# Patient Record
Sex: MALE | Race: WHITE | NOT HISPANIC OR LATINO | Employment: FULL TIME | URBAN - METROPOLITAN AREA
[De-identification: names, ages, dates, MRNs, and addresses within clinical notes are randomized per-mention and may not be internally consistent; named-entity substitution may affect disease eponyms.]

---

## 2021-08-05 ENCOUNTER — HOSPITAL ENCOUNTER (INPATIENT)
Facility: MEDICAL CENTER | Age: 71
LOS: 7 days | DRG: 189 | End: 2021-08-12
Attending: EMERGENCY MEDICINE | Admitting: HOSPITALIST
Payer: MEDICARE

## 2021-08-05 ENCOUNTER — APPOINTMENT (OUTPATIENT)
Dept: RADIOLOGY | Facility: MEDICAL CENTER | Age: 71
DRG: 189 | End: 2021-08-05
Attending: EMERGENCY MEDICINE
Payer: MEDICARE

## 2021-08-05 DIAGNOSIS — J18.9 PNEUMONIA OF BOTH LUNGS DUE TO INFECTIOUS ORGANISM, UNSPECIFIED PART OF LUNG: ICD-10-CM

## 2021-08-05 DIAGNOSIS — J96.01 ACUTE RESPIRATORY FAILURE WITH HYPOXIA (HCC): ICD-10-CM

## 2021-08-05 DIAGNOSIS — J96.01 ACUTE HYPOXEMIC RESPIRATORY FAILURE (HCC): Primary | ICD-10-CM

## 2021-08-05 DIAGNOSIS — J18.9 PNEUMONIA OF RIGHT LOWER LOBE DUE TO INFECTIOUS ORGANISM: ICD-10-CM

## 2021-08-05 DIAGNOSIS — J81.0 ACUTE PULMONARY EDEMA (HCC): ICD-10-CM

## 2021-08-05 PROBLEM — F17.200 NICOTINE DEPENDENCE: Status: ACTIVE | Noted: 2021-08-05

## 2021-08-05 PROBLEM — R44.3 HALLUCINATIONS: Status: ACTIVE | Noted: 2021-08-05

## 2021-08-05 PROBLEM — F10.939 ALCOHOL WITHDRAWAL (HCC): Status: ACTIVE | Noted: 2021-08-05

## 2021-08-05 PROBLEM — R74.8 ALKALINE PHOSPHATASE ELEVATION: Status: ACTIVE | Noted: 2021-08-05

## 2021-08-05 PROBLEM — R79.89 ELEVATED TROPONIN: Status: ACTIVE | Noted: 2021-08-05

## 2021-08-05 PROBLEM — N17.9 ACUTE RENAL FAILURE (ARF) (HCC): Status: ACTIVE | Noted: 2021-08-05

## 2021-08-05 LAB
ALBUMIN SERPL BCP-MCNC: 3.7 G/DL (ref 3.2–4.9)
ALBUMIN/GLOB SERPL: 1 G/DL
ALP SERPL-CCNC: 155 U/L (ref 30–99)
ALT SERPL-CCNC: 19 U/L (ref 2–50)
AMPHET UR QL SCN: NEGATIVE
ANION GAP SERPL CALC-SCNC: 15 MMOL/L (ref 7–16)
APPEARANCE UR: CLEAR
AST SERPL-CCNC: 22 U/L (ref 12–45)
BACTERIA #/AREA URNS HPF: NEGATIVE /HPF
BARBITURATES UR QL SCN: NEGATIVE
BASOPHILS # BLD AUTO: 0.5 % (ref 0–1.8)
BASOPHILS # BLD: 0.05 K/UL (ref 0–0.12)
BENZODIAZ UR QL SCN: NEGATIVE
BILIRUB SERPL-MCNC: 0.5 MG/DL (ref 0.1–1.5)
BILIRUB UR QL STRIP.AUTO: ABNORMAL
BUN SERPL-MCNC: 41 MG/DL (ref 8–22)
BZE UR QL SCN: NEGATIVE
CALCIUM SERPL-MCNC: 10.2 MG/DL (ref 8.5–10.5)
CANNABINOIDS UR QL SCN: NEGATIVE
CHLORIDE SERPL-SCNC: 97 MMOL/L (ref 96–112)
CO2 SERPL-SCNC: 25 MMOL/L (ref 20–33)
COLOR UR: ABNORMAL
CREAT SERPL-MCNC: 2.46 MG/DL (ref 0.5–1.4)
EKG IMPRESSION: NORMAL
EOSINOPHIL # BLD AUTO: 0.02 K/UL (ref 0–0.51)
EOSINOPHIL NFR BLD: 0.2 % (ref 0–6.9)
EPI CELLS #/AREA URNS HPF: NEGATIVE /HPF
ERYTHROCYTE [DISTWIDTH] IN BLOOD BY AUTOMATED COUNT: 61.2 FL (ref 35.9–50)
ETHANOL BLD-MCNC: <10.1 MG/DL (ref 0–10)
FLUAV RNA SPEC QL NAA+PROBE: NEGATIVE
FLUBV RNA SPEC QL NAA+PROBE: NEGATIVE
GLOBULIN SER CALC-MCNC: 3.8 G/DL (ref 1.9–3.5)
GLUCOSE SERPL-MCNC: 150 MG/DL (ref 65–99)
GLUCOSE UR STRIP.AUTO-MCNC: NEGATIVE MG/DL
HCT VFR BLD AUTO: 49.1 % (ref 42–52)
HGB BLD-MCNC: 15.9 G/DL (ref 14–18)
HYALINE CASTS #/AREA URNS LPF: ABNORMAL /LPF
IMM GRANULOCYTES # BLD AUTO: 0.05 K/UL (ref 0–0.11)
IMM GRANULOCYTES NFR BLD AUTO: 0.5 % (ref 0–0.9)
KETONES UR STRIP.AUTO-MCNC: NEGATIVE MG/DL
LACTATE BLD-SCNC: 1.5 MMOL/L (ref 0.5–2)
LACTATE BLD-SCNC: 1.9 MMOL/L (ref 0.5–2)
LACTATE BLD-SCNC: 2.6 MMOL/L (ref 0.5–2)
LEUKOCYTE ESTERASE UR QL STRIP.AUTO: NEGATIVE
LYMPHOCYTES # BLD AUTO: 0.67 K/UL (ref 1–4.8)
LYMPHOCYTES NFR BLD: 7.3 % (ref 22–41)
MAGNESIUM SERPL-MCNC: 2.1 MG/DL (ref 1.5–2.5)
MCH RBC QN AUTO: 32.1 PG (ref 27–33)
MCHC RBC AUTO-ENTMCNC: 32.4 G/DL (ref 33.7–35.3)
MCV RBC AUTO: 99 FL (ref 81.4–97.8)
METHADONE UR QL SCN: NEGATIVE
MICRO URNS: ABNORMAL
MONOCYTES # BLD AUTO: 0.52 K/UL (ref 0–0.85)
MONOCYTES NFR BLD AUTO: 5.6 % (ref 0–13.4)
NEUTROPHILS # BLD AUTO: 7.9 K/UL (ref 1.82–7.42)
NEUTROPHILS NFR BLD: 85.9 % (ref 44–72)
NITRITE UR QL STRIP.AUTO: NEGATIVE
NRBC # BLD AUTO: 0 K/UL
NRBC BLD-RTO: 0 /100 WBC
NT-PROBNP SERPL IA-MCNC: ABNORMAL PG/ML (ref 0–125)
OPIATES UR QL SCN: NEGATIVE
OXYCODONE UR QL SCN: NEGATIVE
PCP UR QL SCN: NEGATIVE
PH UR STRIP.AUTO: 5.5 [PH] (ref 5–8)
PLATELET # BLD AUTO: 251 K/UL (ref 164–446)
PMV BLD AUTO: 10.8 FL (ref 9–12.9)
POTASSIUM SERPL-SCNC: 5.3 MMOL/L (ref 3.6–5.5)
PROPOXYPH UR QL SCN: NEGATIVE
PROT SERPL-MCNC: 7.5 G/DL (ref 6–8.2)
PROT UR QL STRIP: 30 MG/DL
RBC # BLD AUTO: 4.96 M/UL (ref 4.7–6.1)
RBC # URNS HPF: ABNORMAL /HPF
RBC UR QL AUTO: NEGATIVE
RSV RNA SPEC QL NAA+PROBE: NEGATIVE
SARS-COV-2 RNA RESP QL NAA+PROBE: NOTDETECTED
SODIUM SERPL-SCNC: 137 MMOL/L (ref 135–145)
SP GR UR STRIP.AUTO: 1.01
SPECIMEN SOURCE: NORMAL
TROPONIN T SERPL-MCNC: 53 NG/L (ref 6–19)
TROPONIN T SERPL-MCNC: 57 NG/L (ref 6–19)
TROPONIN T SERPL-MCNC: 69 NG/L (ref 6–19)
UROBILINOGEN UR STRIP.AUTO-MCNC: 0.2 MG/DL
WBC # BLD AUTO: 9.2 K/UL (ref 4.8–10.8)
WBC #/AREA URNS HPF: ABNORMAL /HPF

## 2021-08-05 PROCEDURE — 99223 1ST HOSP IP/OBS HIGH 75: CPT | Mod: AI | Performed by: HOSPITALIST

## 2021-08-05 PROCEDURE — 700111 HCHG RX REV CODE 636 W/ 250 OVERRIDE (IP): Performed by: HOSPITALIST

## 2021-08-05 PROCEDURE — 93005 ELECTROCARDIOGRAM TRACING: CPT

## 2021-08-05 PROCEDURE — 87086 URINE CULTURE/COLONY COUNT: CPT

## 2021-08-05 PROCEDURE — 0241U HCHG SARS-COV-2 COVID-19 NFCT DS RESP RNA 4 TRGT MIC: CPT

## 2021-08-05 PROCEDURE — 99285 EMERGENCY DEPT VISIT HI MDM: CPT

## 2021-08-05 PROCEDURE — 85025 COMPLETE CBC W/AUTO DIFF WBC: CPT

## 2021-08-05 PROCEDURE — 93005 ELECTROCARDIOGRAM TRACING: CPT | Performed by: EMERGENCY MEDICINE

## 2021-08-05 PROCEDURE — A9270 NON-COVERED ITEM OR SERVICE: HCPCS | Performed by: HOSPITALIST

## 2021-08-05 PROCEDURE — 80307 DRUG TEST PRSMV CHEM ANLYZR: CPT

## 2021-08-05 PROCEDURE — 71045 X-RAY EXAM CHEST 1 VIEW: CPT

## 2021-08-05 PROCEDURE — 83880 ASSAY OF NATRIURETIC PEPTIDE: CPT

## 2021-08-05 PROCEDURE — HZ2ZZZZ DETOXIFICATION SERVICES FOR SUBSTANCE ABUSE TREATMENT: ICD-10-PCS | Performed by: HOSPITALIST

## 2021-08-05 PROCEDURE — 700102 HCHG RX REV CODE 250 W/ 637 OVERRIDE(OP): Performed by: HOSPITALIST

## 2021-08-05 PROCEDURE — 82077 ASSAY SPEC XCP UR&BREATH IA: CPT

## 2021-08-05 PROCEDURE — 700111 HCHG RX REV CODE 636 W/ 250 OVERRIDE (IP): Performed by: EMERGENCY MEDICINE

## 2021-08-05 PROCEDURE — 80053 COMPREHEN METABOLIC PANEL: CPT

## 2021-08-05 PROCEDURE — 83605 ASSAY OF LACTIC ACID: CPT | Mod: 91

## 2021-08-05 PROCEDURE — 770020 HCHG ROOM/CARE - TELE (206)

## 2021-08-05 PROCEDURE — 84484 ASSAY OF TROPONIN QUANT: CPT

## 2021-08-05 PROCEDURE — 94760 N-INVAS EAR/PLS OXIMETRY 1: CPT

## 2021-08-05 PROCEDURE — 96365 THER/PROPH/DIAG IV INF INIT: CPT

## 2021-08-05 PROCEDURE — 36415 COLL VENOUS BLD VENIPUNCTURE: CPT

## 2021-08-05 PROCEDURE — 83735 ASSAY OF MAGNESIUM: CPT

## 2021-08-05 PROCEDURE — 700105 HCHG RX REV CODE 258: Performed by: EMERGENCY MEDICINE

## 2021-08-05 PROCEDURE — C9803 HOPD COVID-19 SPEC COLLECT: HCPCS | Performed by: EMERGENCY MEDICINE

## 2021-08-05 PROCEDURE — 87040 BLOOD CULTURE FOR BACTERIA: CPT

## 2021-08-05 PROCEDURE — 700101 HCHG RX REV CODE 250: Performed by: EMERGENCY MEDICINE

## 2021-08-05 PROCEDURE — 96375 TX/PRO/DX INJ NEW DRUG ADDON: CPT

## 2021-08-05 PROCEDURE — 81001 URINALYSIS AUTO W/SCOPE: CPT

## 2021-08-05 RX ORDER — NICOTINE POLACRILEX 4 MG/1
20 GUM, CHEWING ORAL EVERY EVENING
Status: ON HOLD | COMMUNITY
End: 2021-08-12

## 2021-08-05 RX ORDER — PREGABALIN 50 MG/1
50 CAPSULE ORAL 2 TIMES DAILY
Status: ON HOLD | COMMUNITY
End: 2021-08-12

## 2021-08-05 RX ORDER — LORAZEPAM 0.5 MG/1
0.5 TABLET ORAL EVERY 4 HOURS PRN
Status: DISCONTINUED | OUTPATIENT
Start: 2021-08-05 | End: 2021-08-09 | Stop reason: ALTCHOICE

## 2021-08-05 RX ORDER — GAUZE BANDAGE 2" X 2"
100 BANDAGE TOPICAL DAILY
Status: COMPLETED | OUTPATIENT
Start: 2021-08-05 | End: 2021-08-08

## 2021-08-05 RX ORDER — TRAZODONE HYDROCHLORIDE 50 MG/1
50 TABLET ORAL
Status: ON HOLD | COMMUNITY
End: 2021-08-12

## 2021-08-05 RX ORDER — FUROSEMIDE 20 MG/1
20 TABLET ORAL 2 TIMES DAILY
Status: ON HOLD | COMMUNITY
End: 2021-08-12

## 2021-08-05 RX ORDER — LABETALOL HYDROCHLORIDE 5 MG/ML
10 INJECTION, SOLUTION INTRAVENOUS EVERY 4 HOURS PRN
Status: DISCONTINUED | OUTPATIENT
Start: 2021-08-05 | End: 2021-08-12 | Stop reason: HOSPADM

## 2021-08-05 RX ORDER — ONDANSETRON 2 MG/ML
4 INJECTION INTRAMUSCULAR; INTRAVENOUS EVERY 4 HOURS PRN
Status: DISCONTINUED | OUTPATIENT
Start: 2021-08-05 | End: 2021-08-09 | Stop reason: ALTCHOICE

## 2021-08-05 RX ORDER — AMOXICILLIN 250 MG
2 CAPSULE ORAL 2 TIMES DAILY
Status: DISCONTINUED | OUTPATIENT
Start: 2021-08-06 | End: 2021-08-12 | Stop reason: HOSPADM

## 2021-08-05 RX ORDER — ALLOPURINOL 100 MG/1
50-100 TABLET ORAL 2 TIMES DAILY
Status: ON HOLD | COMMUNITY
End: 2021-08-12 | Stop reason: SDUPTHER

## 2021-08-05 RX ORDER — B-COMPLEX WITH VITAMIN C
1 TABLET ORAL 2 TIMES DAILY
Status: ON HOLD | COMMUNITY
End: 2021-08-12

## 2021-08-05 RX ORDER — ONDANSETRON 4 MG/1
4 TABLET, ORALLY DISINTEGRATING ORAL EVERY 4 HOURS PRN
Status: DISCONTINUED | OUTPATIENT
Start: 2021-08-05 | End: 2021-08-09 | Stop reason: ALTCHOICE

## 2021-08-05 RX ORDER — OMEPRAZOLE 20 MG/1
20 CAPSULE, DELAYED RELEASE ORAL EVERY EVENING
Status: DISCONTINUED | OUTPATIENT
Start: 2021-08-05 | End: 2021-08-12 | Stop reason: HOSPADM

## 2021-08-05 RX ORDER — POLYETHYLENE GLYCOL 3350 17 G/17G
1 POWDER, FOR SOLUTION ORAL
Status: DISCONTINUED | OUTPATIENT
Start: 2021-08-05 | End: 2021-08-12 | Stop reason: HOSPADM

## 2021-08-05 RX ORDER — BISACODYL 10 MG
10 SUPPOSITORY, RECTAL RECTAL
Status: DISCONTINUED | OUTPATIENT
Start: 2021-08-05 | End: 2021-08-12 | Stop reason: HOSPADM

## 2021-08-05 RX ORDER — LORAZEPAM 2 MG/ML
1.5 INJECTION INTRAMUSCULAR
Status: DISCONTINUED | OUTPATIENT
Start: 2021-08-05 | End: 2021-08-09 | Stop reason: ALTCHOICE

## 2021-08-05 RX ORDER — CYCLOBENZAPRINE HCL 10 MG
10 TABLET ORAL
Status: ON HOLD | COMMUNITY
End: 2021-08-12

## 2021-08-05 RX ORDER — LORAZEPAM 2 MG/ML
1 INJECTION INTRAMUSCULAR
Status: DISCONTINUED | OUTPATIENT
Start: 2021-08-05 | End: 2021-08-09 | Stop reason: ALTCHOICE

## 2021-08-05 RX ORDER — LORAZEPAM 2 MG/ML
2 INJECTION INTRAMUSCULAR
Status: DISCONTINUED | OUTPATIENT
Start: 2021-08-05 | End: 2021-08-09 | Stop reason: ALTCHOICE

## 2021-08-05 RX ORDER — FOLIC ACID 1 MG/1
1 TABLET ORAL DAILY
Status: COMPLETED | OUTPATIENT
Start: 2021-08-05 | End: 2021-08-08

## 2021-08-05 RX ORDER — FUROSEMIDE 20 MG/1
20 TABLET ORAL 2 TIMES DAILY
Status: DISCONTINUED | OUTPATIENT
Start: 2021-08-05 | End: 2021-08-09

## 2021-08-05 RX ORDER — SPIRONOLACTONE 25 MG/1
12.5 TABLET ORAL EVERY MORNING
Status: DISCONTINUED | OUTPATIENT
Start: 2021-08-05 | End: 2021-08-12 | Stop reason: HOSPADM

## 2021-08-05 RX ORDER — HEPARIN SODIUM 5000 [USP'U]/ML
5000 INJECTION, SOLUTION INTRAVENOUS; SUBCUTANEOUS EVERY 8 HOURS
Status: DISCONTINUED | OUTPATIENT
Start: 2021-08-05 | End: 2021-08-12 | Stop reason: HOSPADM

## 2021-08-05 RX ORDER — LIDOCAINE 40 MG/G
1 CREAM TOPICAL
Status: ON HOLD | COMMUNITY
End: 2021-08-12

## 2021-08-05 RX ORDER — HYDRALAZINE HYDROCHLORIDE 25 MG/1
25 TABLET, FILM COATED ORAL 2 TIMES DAILY
Status: DISCONTINUED | OUTPATIENT
Start: 2021-08-05 | End: 2021-08-12 | Stop reason: HOSPADM

## 2021-08-05 RX ORDER — SPIRONOLACTONE 25 MG/1
12.5 TABLET ORAL EVERY MORNING
Status: ON HOLD | COMMUNITY
End: 2021-08-12

## 2021-08-05 RX ORDER — AMOXICILLIN 250 MG
1 CAPSULE ORAL 2 TIMES DAILY
Status: ON HOLD | COMMUNITY
End: 2021-08-12

## 2021-08-05 RX ORDER — METOPROLOL SUCCINATE 100 MG/1
100 TABLET, EXTENDED RELEASE ORAL EVERY EVENING
Status: ON HOLD | COMMUNITY
End: 2021-08-12

## 2021-08-05 RX ORDER — AMIODARONE HYDROCHLORIDE 200 MG/1
200 TABLET ORAL EVERY EVENING
Status: DISCONTINUED | OUTPATIENT
Start: 2021-08-05 | End: 2021-08-12 | Stop reason: HOSPADM

## 2021-08-05 RX ORDER — HYDRALAZINE HYDROCHLORIDE 25 MG/1
25-50 TABLET, FILM COATED ORAL 2 TIMES DAILY
Status: ON HOLD | COMMUNITY
End: 2021-08-12

## 2021-08-05 RX ORDER — CLONIDINE HYDROCHLORIDE 0.1 MG/1
0.1 TABLET ORAL 2 TIMES DAILY
Status: DISCONTINUED | OUTPATIENT
Start: 2021-08-05 | End: 2021-08-12 | Stop reason: HOSPADM

## 2021-08-05 RX ORDER — CLONIDINE HYDROCHLORIDE 0.1 MG/1
0.1 TABLET ORAL 2 TIMES DAILY
Status: ON HOLD | COMMUNITY
End: 2021-08-12

## 2021-08-05 RX ORDER — LORAZEPAM 2 MG/ML
0.5 INJECTION INTRAMUSCULAR EVERY 4 HOURS PRN
Status: DISCONTINUED | OUTPATIENT
Start: 2021-08-05 | End: 2021-08-09 | Stop reason: ALTCHOICE

## 2021-08-05 RX ORDER — LORAZEPAM 2 MG/1
2 TABLET ORAL
Status: DISCONTINUED | OUTPATIENT
Start: 2021-08-05 | End: 2021-08-09 | Stop reason: ALTCHOICE

## 2021-08-05 RX ORDER — AZITHROMYCIN 500 MG/5ML
500 INJECTION, POWDER, LYOPHILIZED, FOR SOLUTION INTRAVENOUS ONCE
Status: COMPLETED | OUTPATIENT
Start: 2021-08-05 | End: 2021-08-05

## 2021-08-05 RX ORDER — LORAZEPAM 1 MG/1
1 TABLET ORAL EVERY 4 HOURS PRN
Status: DISCONTINUED | OUTPATIENT
Start: 2021-08-05 | End: 2021-08-09 | Stop reason: ALTCHOICE

## 2021-08-05 RX ORDER — NICOTINE 21 MG/24HR
14 PATCH, TRANSDERMAL 24 HOURS TRANSDERMAL
Status: DISCONTINUED | OUTPATIENT
Start: 2021-08-05 | End: 2021-08-12 | Stop reason: HOSPADM

## 2021-08-05 RX ORDER — AZITHROMYCIN 500 MG/5ML
500 INJECTION, POWDER, LYOPHILIZED, FOR SOLUTION INTRAVENOUS EVERY 24 HOURS
Status: DISCONTINUED | OUTPATIENT
Start: 2021-08-06 | End: 2021-08-06

## 2021-08-05 RX ORDER — METOPROLOL SUCCINATE 50 MG/1
100 TABLET, EXTENDED RELEASE ORAL EVERY EVENING
Status: DISCONTINUED | OUTPATIENT
Start: 2021-08-05 | End: 2021-08-12 | Stop reason: HOSPADM

## 2021-08-05 RX ORDER — LORAZEPAM 2 MG/1
4 TABLET ORAL
Status: DISCONTINUED | OUTPATIENT
Start: 2021-08-05 | End: 2021-08-09 | Stop reason: ALTCHOICE

## 2021-08-05 RX ORDER — AMIODARONE HYDROCHLORIDE 200 MG/1
200 TABLET ORAL EVERY EVENING
COMMUNITY

## 2021-08-05 RX ORDER — ACETAMINOPHEN 325 MG/1
650 TABLET ORAL EVERY 6 HOURS PRN
Status: DISCONTINUED | OUTPATIENT
Start: 2021-08-05 | End: 2021-08-12 | Stop reason: HOSPADM

## 2021-08-05 RX ORDER — SIMVASTATIN 20 MG
20 TABLET ORAL EVERY EVENING
Status: ON HOLD | COMMUNITY
End: 2021-08-12

## 2021-08-05 RX ADMIN — HEPARIN SODIUM 5000 UNITS: 5000 INJECTION, SOLUTION INTRAVENOUS; SUBCUTANEOUS at 23:02

## 2021-08-05 RX ADMIN — LORAZEPAM 1 MG: 2 INJECTION INTRAMUSCULAR; INTRAVENOUS at 17:45

## 2021-08-05 RX ADMIN — CLONIDINE HYDROCHLORIDE 0.1 MG: 0.1 TABLET ORAL at 17:35

## 2021-08-05 RX ADMIN — HYDRALAZINE HYDROCHLORIDE 25 MG: 25 TABLET, FILM COATED ORAL at 17:34

## 2021-08-05 RX ADMIN — FOLIC ACID 1 MG: 1 TABLET ORAL at 16:55

## 2021-08-05 RX ADMIN — LORAZEPAM 0.5 MG: 2 INJECTION INTRAMUSCULAR; INTRAVENOUS at 23:02

## 2021-08-05 RX ADMIN — OMEPRAZOLE 20 MG: 20 CAPSULE, DELAYED RELEASE ORAL at 17:35

## 2021-08-05 RX ADMIN — SPIRONOLACTONE 12.5 MG: 25 TABLET ORAL at 16:54

## 2021-08-05 RX ADMIN — METOPROLOL SUCCINATE 100 MG: 50 TABLET, EXTENDED RELEASE ORAL at 17:34

## 2021-08-05 RX ADMIN — LORAZEPAM 1 MG: 2 INJECTION INTRAMUSCULAR; INTRAVENOUS at 20:00

## 2021-08-05 RX ADMIN — FUROSEMIDE 20 MG: 20 TABLET ORAL at 17:34

## 2021-08-05 RX ADMIN — Medication 100 MG: at 16:55

## 2021-08-05 RX ADMIN — AMIODARONE HYDROCHLORIDE 200 MG: 200 TABLET ORAL at 17:35

## 2021-08-05 RX ADMIN — THERA TABS 1 TABLET: TAB at 16:54

## 2021-08-05 RX ADMIN — NICOTINE 14 MG: 14 PATCH TRANSDERMAL at 16:55

## 2021-08-05 RX ADMIN — AZITHROMYCIN MONOHYDRATE 500 MG: 500 INJECTION, POWDER, LYOPHILIZED, FOR SOLUTION INTRAVENOUS at 14:06

## 2021-08-05 RX ADMIN — CEFTRIAXONE SODIUM 2 G: 10 INJECTION, POWDER, FOR SOLUTION INTRAVENOUS at 12:11

## 2021-08-05 RX ADMIN — ASPIRIN 81 MG: 81 TABLET, COATED ORAL at 16:54

## 2021-08-05 ASSESSMENT — ENCOUNTER SYMPTOMS
DIAPHORESIS: 0
HEADACHES: 0
ABDOMINAL PAIN: 0
DOUBLE VISION: 0
WEIGHT LOSS: 0
SORE THROAT: 0
FALLS: 0
HEARTBURN: 0
NECK PAIN: 0
SENSORY CHANGE: 1
DIARRHEA: 0
FOCAL WEAKNESS: 0
CHILLS: 0
SHORTNESS OF BREATH: 1
NAUSEA: 0
GASTROINTESTINAL NEGATIVE: 1
BACK PAIN: 1
BLURRED VISION: 0
WEAKNESS: 1
PALPITATIONS: 0
BRUISES/BLEEDS EASILY: 0
SINUS PAIN: 0
COUGH: 1
HALLUCINATIONS: 1
CARDIOVASCULAR NEGATIVE: 1
CONSTITUTIONAL NEGATIVE: 1
DIZZINESS: 0
DEPRESSION: 0
FEVER: 0
EYES NEGATIVE: 1
BLOOD IN STOOL: 0
CONSTIPATION: 0
VOMITING: 0
MYALGIAS: 0

## 2021-08-05 ASSESSMENT — LIFESTYLE VARIABLES
AUDITORY DISTURBANCES: NOT PRESENT
HEADACHE, FULLNESS IN HEAD: NOT PRESENT
DOES PATIENT WANT TO STOP DRINKING: YES
AGITATION: *
PAROXYSMAL SWEATS: NO SWEAT VISIBLE
AUDITORY DISTURBANCES: NOT PRESENT
ORIENTATION AND CLOUDING OF SENSORIUM: DISORIENTED FOR PLACE AND / OR PERSON
HEADACHE, FULLNESS IN HEAD: NOT PRESENT
HAVE PEOPLE ANNOYED YOU BY CRITICIZING YOUR DRINKING: YES
SUBSTANCE_ABUSE: 1
DOES PATIENT WANT TO STOP DRINKING: NO
NAUSEA AND VOMITING: NO NAUSEA AND NO VOMITING
AGITATION: SOMEWHAT MORE THAN NORMAL ACTIVITY
NAUSEA AND VOMITING: NO NAUSEA AND NO VOMITING
PAROXYSMAL SWEATS: NO SWEAT VISIBLE
TOTAL SCORE: 3
ANXIETY: *
TOTAL SCORE: 3
AVERAGE NUMBER OF DAYS PER WEEK YOU HAVE A DRINK CONTAINING ALCOHOL: 6
CONSUMPTION TOTAL: POSITIVE
TOTAL SCORE: 3
VISUAL DISTURBANCES: MODERATE SENSITIVITY
TREMOR: NO TREMOR
ANXIETY: *
NAUSEA AND VOMITING: NO NAUSEA AND NO VOMITING
AGITATION: *
PAROXYSMAL SWEATS: NO SWEAT VISIBLE
DO YOU DRINK ALCOHOL: YES
HEADACHE, FULLNESS IN HEAD: NOT PRESENT
TREMOR: NO TREMOR
ORIENTATION AND CLOUDING OF SENSORIUM: DISORIENTED FOR PLACE AND / OR PERSON
ORIENTATION AND CLOUDING OF SENSORIUM: ORIENTED AND CAN DO SERIAL ADDITIONS
ORIENTATION AND CLOUDING OF SENSORIUM: ORIENTED AND CAN DO SERIAL ADDITIONS
TOTAL SCORE: 11
VISUAL DISTURBANCES: MODERATE SENSITIVITY
TREMOR: NO TREMOR
DO YOU DRINK ALCOHOL: NO
NAUSEA AND VOMITING: NO NAUSEA AND NO VOMITING
AUDITORY DISTURBANCES: NOT PRESENT
ANXIETY: *
EVER HAD A DRINK FIRST THING IN THE MORNING TO STEADY YOUR NERVES TO GET RID OF A HANGOVER: NO
VISUAL DISTURBANCES: MODERATELY SEVERE HALLUCINATIONS
AGITATION: SOMEWHAT MORE THAN NORMAL ACTIVITY
EVER FELT BAD OR GUILTY ABOUT YOUR DRINKING: YES
VISUAL DISTURBANCES: MODERATE SENSITIVITY
TOTAL SCORE: 13
HEADACHE, FULLNESS IN HEAD: NOT PRESENT
AUDITORY DISTURBANCES: NOT PRESENT
TREMOR: NO TREMOR
HAVE YOU EVER FELT YOU SHOULD CUT DOWN ON YOUR DRINKING: YES
ANXIETY: MILDLY ANXIOUS
ON A TYPICAL DAY WHEN YOU DRINK ALCOHOL HOW MANY DRINKS DO YOU HAVE: 5
PAROXYSMAL SWEATS: NO SWEAT VISIBLE
TOTAL SCORE: 10
TOTAL SCORE: 5
HOW MANY TIMES IN THE PAST YEAR HAVE YOU HAD 5 OR MORE DRINKS IN A DAY: 350

## 2021-08-05 ASSESSMENT — COGNITIVE AND FUNCTIONAL STATUS - GENERAL
HELP NEEDED FOR BATHING: A LOT
MOVING FROM LYING ON BACK TO SITTING ON SIDE OF FLAT BED: A LOT
STANDING UP FROM CHAIR USING ARMS: A LOT
DRESSING REGULAR UPPER BODY CLOTHING: A LOT
DAILY ACTIVITIY SCORE: 13
SUGGESTED CMS G CODE MODIFIER MOBILITY: CL
MOBILITY SCORE: 14
PERSONAL GROOMING: A LOT
DRESSING REGULAR LOWER BODY CLOTHING: A LOT
SUGGESTED CMS G CODE MODIFIER DAILY ACTIVITY: CL
MOVING TO AND FROM BED TO CHAIR: A LOT
WALKING IN HOSPITAL ROOM: A LOT
TOILETING: A LOT
EATING MEALS: A LITTLE
CLIMB 3 TO 5 STEPS WITH RAILING: A LOT

## 2021-08-05 ASSESSMENT — FIBROSIS 4 INDEX: FIB4 SCORE: 1.47

## 2021-08-05 ASSESSMENT — PATIENT HEALTH QUESTIONNAIRE - PHQ9
SUM OF ALL RESPONSES TO PHQ9 QUESTIONS 1 AND 2: 0
2. FEELING DOWN, DEPRESSED, IRRITABLE, OR HOPELESS: NOT AT ALL
1. LITTLE INTEREST OR PLEASURE IN DOING THINGS: NOT AT ALL

## 2021-08-05 ASSESSMENT — PAIN DESCRIPTION - PAIN TYPE
TYPE: CHRONIC PAIN
TYPE: CHRONIC PAIN
TYPE: NEUROPATHIC PAIN;CHRONIC PAIN

## 2021-08-05 NOTE — ED PROVIDER NOTES
ED Provider Note    Scribed for Gisbon Haywood M.D. by Ashley Walton. 8/5/2021  11:14 AM    Primary care provider: None noted  Means of arrival: EMS  History obtained from: Patient  History limited by: Altered mental status    CHIEF COMPLAINT  Chief Complaint   Patient presents with    Fatigue    Shortness of Breath     HPI  Ra Ware is a 71 y.o. male who presents to the Emergency Department with worsening shortness of breath and fatigue. His symptoms initially onset last Friday. He states that he is not usually reliant on oxygen in the home, and he is vaccinated for COVID-19. He takes a daily regimen of Dieretics that he admits he is still compliant with. He has no history of CHF. He denies any associated confusion, fevers, cough, or loss of smell/taste. He admits to alcohol ingestion yesterday night.     HPI limited secondary to Altered mental status.     REVIEW OF SYSTEMS  Pertinent positives include shortness of breath and fatigue. Pertinent negatives include no confusion, fevers, cough, or loss of smell/taste.  All other systems reviewed and negative.  ROS limited secondary to Altered mental status.     PAST MEDICAL HISTORY   has a past medical history of CAD (coronary artery disease) and HTN (hypertension).    SURGICAL HISTORY   has a past surgical history that includes pacemaker insertion.    SOCIAL HISTORY  Social History     Tobacco Use    Smoking status: Never Smoker    Smokeless tobacco: Never Used   Substance Use Topics    Alcohol use: Yes    Drug use: None noted      FAMILY HISTORY  History reviewed. No pertinent family history.    CURRENT MEDICATIONS  No current outpatient medications    ALLERGIES  Allergies   Allergen Reactions    Morphine Rash and Itching             PHYSICAL EXAM  VITAL SIGNS: /92   Pulse 91   Temp 36.7 °C (98 °F) (Temporal)   Resp (!) 26   Ht 1.829 m (6')   Wt 95.3 kg (210 lb)   SpO2 99%   BMI 28.48 kg/m²     Constitutional: Well developed, Well nourished,  moderate distress, Non-toxic appearance.   HENT: Normocephalic, Atraumatic, Bilateral external ears normal, Oropharynx moist, No oral exudates.   Eyes: PERRLA, EOMI, Conjunctiva normal, No discharge.   Neck: No tenderness, Supple, No stridor.   Lymphatic: No lymphadenopathy noted.   Cardiovascular: Normal heart rate, Normal rhythm.   Thorax & Lungs: Decreased breath sounds throughout, Crackles in bilateral bases, No respiratory distress, No wheezing,    Abdomen: Soft, No tenderness, No masses, No pulsatile masses.   Skin: Warm, Dry, No erythema, No rash.   Extremities:, Diffuse lower extremity edema, No cyanosis.   Musculoskeletal: No tenderness to palpation or major deformities noted.  Intact distal pulses  Neurologic: Mumbled speech, Seems confused and sleepy, Cranial nerves 2-11 intact, muscle strength 5/5 in bilateral upper and lower extremities   Psychiatric: Affect normal, Judgment normal, Mood normal.     EKG  Results for orders placed or performed during the hospital encounter of 21   EKG   Result Value Ref Range    Report       [facility] Emergency Dept.    Test Date:  2021  Pt Name:    DORA PLUNKETT                  Department: ER  MRN:        [id number]                      Room:        19  Gender:     Male                         Technician: [id number]  :        1950                   Requested By:ER TRIAGE PROTOCOL  Order #:    [id number]                    Reading MD: JUANITA YOUNG MD    Measurements  Intervals                                Axis  Rate:       62                           P:          109  NE:         212                          QRS:        245  QRSD:       144                          T:          60  QT:         472  QTc:        480    Interpretive Statements  A-V DUAL-PACED RHYTHM WITH SOME INHIBITION  NO FURTHER ANALYSIS ATTEMPTED DUE TO PACED RHYTHM  No previous ECG available for comparison  Electronically Signed On 2021 13:28:24 PDT by  JUANITA YOUNG MD       LABS  Results for orders placed or performed during the hospital encounter of 08/05/21   Lactic acid (lactate)   Result Value Ref Range    Lactic Acid 2.6 (H) 0.5 - 2.0 mmol/L   Lactic acid (lactate): Repeat if initial lactic acid result is greater than 2   Result Value Ref Range    Lactic Acid 1.9 0.5 - 2.0 mmol/L   CBC WITH DIFFERENTIAL   Result Value Ref Range    WBC 9.2 4.8 - 10.8 K/uL    RBC 4.96 4.70 - 6.10 M/uL    Hemoglobin 15.9 14.0 - 18.0 g/dL    Hematocrit 49.1 42.0 - 52.0 %    MCV 99.0 (H) 81.4 - 97.8 fL    MCH 32.1 27.0 - 33.0 pg    MCHC 32.4 (L) 33.7 - 35.3 g/dL    RDW 61.2 (H) 35.9 - 50.0 fL    Platelet Count 251 164 - 446 K/uL    MPV 10.8 9.0 - 12.9 fL    Neutrophils-Polys 85.90 (H) 44.00 - 72.00 %    Lymphocytes 7.30 (L) 22.00 - 41.00 %    Monocytes 5.60 0.00 - 13.40 %    Eosinophils 0.20 0.00 - 6.90 %    Basophils 0.50 0.00 - 1.80 %    Immature Granulocytes 0.50 0.00 - 0.90 %    Nucleated RBC 0.00 /100 WBC    Neutrophils (Absolute) 7.90 (H) 1.82 - 7.42 K/uL    Lymphs (Absolute) 0.67 (L) 1.00 - 4.80 K/uL    Monos (Absolute) 0.52 0.00 - 0.85 K/uL    Eos (Absolute) 0.02 0.00 - 0.51 K/uL    Baso (Absolute) 0.05 0.00 - 0.12 K/uL    Immature Granulocytes (abs) 0.05 0.00 - 0.11 K/uL    NRBC (Absolute) 0.00 K/uL   COMP METABOLIC PANEL   Result Value Ref Range    Sodium 137 135 - 145 mmol/L    Potassium 5.3 3.6 - 5.5 mmol/L    Chloride 97 96 - 112 mmol/L    Co2 25 20 - 33 mmol/L    Anion Gap 15.0 7.0 - 16.0    Glucose 150 (H) 65 - 99 mg/dL    Bun 41 (H) 8 - 22 mg/dL    Creatinine 2.46 (H) 0.50 - 1.40 mg/dL    Calcium 10.2 8.5 - 10.5 mg/dL    AST(SGOT) 22 12 - 45 U/L    ALT(SGPT) 19 2 - 50 U/L    Alkaline Phosphatase 155 (H) 30 - 99 U/L    Total Bilirubin 0.5 0.1 - 1.5 mg/dL    Albumin 3.7 3.2 - 4.9 g/dL    Total Protein 7.5 6.0 - 8.2 g/dL    Globulin 3.8 (H) 1.9 - 3.5 g/dL    A-G Ratio 1.0 g/dL   URINALYSIS    Specimen: Urine, Clean Catch   Result Value Ref Range    Color DK  Yellow     Character Clear     Specific Gravity 1.015 <1.035    Ph 5.5 5.0 - 8.0    Glucose Negative Negative mg/dL    Ketones Negative Negative mg/dL    Protein 30 (A) Negative mg/dL    Bilirubin Small (A) Negative    Urobilinogen, Urine 0.2 Negative    Nitrite Negative Negative    Leukocyte Esterase Negative Negative    Occult Blood Negative Negative    Micro Urine Req Microscopic    TROPONIN   Result Value Ref Range    Troponin T 57 (H) 6 - 19 ng/L   COV-2, FLU A/B, AND RSV BY PCR (2-4 HOURS CEPHEID): Collect NP swab in VTM    Specimen: Respirate   Result Value Ref Range    Influenza virus A RNA Negative Negative    Influenza virus B, PCR Negative Negative    RSV, PCR Negative Negative    SARS-CoV-2 by PCR NotDetected     SARS-CoV-2 Source NP Swab    URINE DRUG SCREEN   Result Value Ref Range    Amphetamines Urine Negative Negative    Barbiturates Negative Negative    Benzodiazepines Negative Negative    Cocaine Metabolite Negative Negative    Methadone Negative Negative    Opiates Negative Negative    Oxycodone Negative Negative    Phencyclidine -Pcp Negative Negative    Propoxyphene Negative Negative    Cannabinoid Metab Negative Negative   DIAGNOSTIC ALCOHOL   Result Value Ref Range    Diagnostic Alcohol <10.1 0.0 - 10.0 mg/dL   ESTIMATED GFR   Result Value Ref Range    GFR If  31 (A) >60 mL/min/1.73 m 2    GFR If Non African American 26 (A) >60 mL/min/1.73 m 2   proBrain Natriuretic Peptide, NT   Result Value Ref Range    NT-proBNP 71757 (H) 0 - 125 pg/mL   URINE MICROSCOPIC (W/UA)   Result Value Ref Range    WBC 0-2 (A) /hpf    RBC 0-2 (A) /hpf    Bacteria Negative None /hpf    Epithelial Cells Negative /hpf    Hyaline Cast 0-2 /lpf   EKG   Result Value Ref Range    Report       [facility] Emergency Dept.    Test Date:  2021-08-05  Pt Name:    DORA PLUNKETT                  Department: ER  MRN:        [id number]                      Room:       BL 19  Gender:     Male                          Technician: [id number]  :        1950                   Requested By:ER TRIAGE PROTOCOL  Order #:    [id number]                    Reading MD: JUANITA YOUNG MD    Measurements  Intervals                                Axis  Rate:       62                           P:          109  DC:         212                          QRS:        245  QRSD:       144                          T:          60  QT:         472  QTc:        480    Interpretive Statements  A-V DUAL-PACED RHYTHM WITH SOME INHIBITION  NO FURTHER ANALYSIS ATTEMPTED DUE TO PACED RHYTHM  No previous ECG available for comparison  Electronically Signed On 2021 13:28:24 PDT by JUANITA YOUNG MD          RADIOLOGY  DX-CHEST-PORTABLE (1 VIEW)   Final Result      1.  There is no enlarged cardiac silhouette with probable changes of vascular congestion/edema.   2.  Right lower lobe opacity could be edema, atelectasis or pneumonitis.   3.  There is a small right pleural effusion.      EC-ECHOCARDIOGRAM COMPLETE W/O CONT    (Results Pending)     The radiologist's interpretation of all radiological studies have been reviewed by me.      COURSE & MEDICAL DECISION MAKING  Pertinent Labs & Imaging studies reviewed. (See chart for details)    11:14 AM - Patient seen and examined at bedside. Ordered DX-chest, lactic acid, diagnostic alcohol, urine drug screen, troponin, COV-2 FLU, CBC with diff, CMP, urinalysis, urine culture, blood culture, and EKG to evaluate his symptoms. The differential diagnoses include but are not limited to: sepsis, CHF, or PE.    1:37 PM I discussed the patient's case and the above findings with Dr. Prieto (hospitalist) who agrees to hospitalize the patient and take over plan of care moving forward.     CRITICAL CARE  The very real possibilty of a deterioration of this patient's condition required the highest level of my preparedness for sudden, emergent intervention.  I provided critical care services, which  included medication orders, frequent reevaluations of the patient's condition and response to treatment, ordering and reviewing test results, and discussing the case with various consultants.  The critical care time associated with the care of the patient was 37 minutes. Review chart for interventions. This time is exclusive of any other billable procedures.     Decision Making:  Patient is coming in with generalized fatigue, shortness of breath. The patient is a very poor historian, visiting from out of town, the patient appears to be fluid overloaded however the patient does have a pneumonia on chest x-ray. Give the patient IV antibiotics, discussed the case with the hospitalist for hospitalization.    DISPOSITION:  Patient will be hospitalized by Dr. Prieto in critical condition.    FINAL IMPRESSION  1. Acute respiratory failure with hypoxia (HCC)    2. Pneumonia of right lower lobe due to infectious organism    3. Acute pulmonary edema (HCC)       Altered Mental Status     Ashley TERRAZAS (Rakesh), am scribing for, and in the presence of, Gibson Haywood M.D..    Electronically signed by: Ashley Walton (Rakesh), 8/5/2021    IGibson M.D. personally performed the services described in this documentation, as scribed by Ashley Walton in my presence, and it is both accurate and complete.    The note accurately reflects work and decisions made by me.  Gibson Haywood M.D.  8/5/2021  5:14 PM

## 2021-08-05 NOTE — PROGRESS NOTES
4 Eyes Skin Assessment Completed by LINDSEY Jones and LINDSEY Duarte.    Head WDL  Ears Redness and Blanching  Nose Redness and Blanching  Mouth WDL  Neck WDL  Breast/Chest WDL  Shoulder Blades WDL  Spine Redness and Blanching  (R) Arm/Elbow/Hand Redness, Blanching, Bruising, Abrasion and Scab; abrasion/scab to top pf right wrist  (L) Arm/Elbow/Hand Redness, Blanching and Bruising  Abdomen WDL  Groin WDL  Scrotum/Coccyx/Buttocks Redness and Blanching  (R) Leg Bruising and Edema; abrasion to right knee  (L) Leg Bruising, Swelling and Edema  (R) Heel/Foot/Toe Redness, Blanching and Discoloration; dry flaky heels  (L) Heel/Foot/Toe Redness, Blanching and Discoloration; dry flaky heels          Devices In Places Tele Box, Pulse Ox, Condom Cath and Nasal Cannula      Interventions In Place Gray Ear Foams, Pillows and Heels Loaded W/Pillows    Possible Skin Injury No    Pictures Uploaded Into Epic N/A  Wound Consult Placed N/A  RN Wound Prevention Protocol Ordered No

## 2021-08-05 NOTE — ASSESSMENT & PLAN NOTE
Suspected right sided; resolved  B/c and sputum cultures: NEG  Completed Augmentin PO  On aspiration protocol  On seizure protocol  Incentive spirometer

## 2021-08-05 NOTE — ED NOTES
"Patient is currently hallucinating seeing 'fish bowls\" in the room. Pt's wife is very concerned by his behavior.   She reports he started taking Lyrica for nerve pain 3 days ago and has been very sleepy since but has continued to drink. Pt has not drank for the last day and a half and continues to be tired and weak and confused. She is leaving at this time and left her phone number. Updated on POC thus far.     "

## 2021-08-05 NOTE — ASSESSMENT & PLAN NOTE
Mild; Follow-up CMP  GGT: 76; slightly elevated; due to liver/biliary component (most likely his alcohol use)

## 2021-08-05 NOTE — ASSESSMENT & PLAN NOTE
Suspect due to CHF and pneumonia  ELEAZAR? Patient remarked being diagnosed but not on CPAP; mallempai: 4; ABG: respi acidosis   *Does not tolerate CPAP  Completed Augmentin  ECHO: Grade 3 diastolic dysfunction, EF 70%  Continue lasix; discontinue spironolactone (K: 4.6)  Continue oxygen (not reliant on O2 at home); try to wean off  Continue Duonebs, d/c prednisone 40 mg  Query underlying copd (mild expiratory wheezes) with extensive smoking h/o  Mobilize patient as tolerated; PT/OT consulted: They now recommend home health. I talked to his wife via phone 2 times today and she is very concerned because he has had home health in the past and it has not worked (he goes back to drinking and not being able to do his ADLs); continues to feel that the best thing for him is SNF/IRF in order for him to further strengthen and be able to take care of himself.  We will follow up recommendations.Ultimately, it is up to the patient.  Right now discharge is pending placement arrangements.

## 2021-08-05 NOTE — ED TRIAGE NOTES
Pt arrives to ED from hotel c/o SOB, weakness, lethargy x 3 days. Per ems wife reports is getting progressively worse. Is normally a&ox4 but is a&ox3 per wife. Is globally weak but no other focal deficits. Does drink almost daily. Feels more sob than normal was initally 88% improved on 4L NC. Is visiting from out of town.

## 2021-08-05 NOTE — ED NOTES
Med rec completed per Pt's spouse at bedside; medications reviewed per pill planner provided by Pt's spouse; Pt's [facility] in [place] ([phone]) contacted for med list to verify Pt's PRN medications (Flexeril and trazodone) and strengths/directions for other medications.  Allergies reviewed with Pt's spouse.  Pt's spouse unsure of strengths of Pt's vitamins/supplements.  Per [facility], Pt is prescribed hydralazine 25 mg with directions to take 1 tablet 3 times per day. Per pill planner at bedside, Pt takes 1 tablet (25 mg) in the morning and 2 tablets (50 mg) in the evening.  No oral antibiotics in last 30 days.

## 2021-08-05 NOTE — ASSESSMENT & PLAN NOTE
Decreased GFR; unknown baseline    FeNa: <1%; most likely prerenal  Condom kidd on  BUN: 36  CR: 1.7

## 2021-08-05 NOTE — ED NOTES
Pt given antibiotics per orders. Blood cultures completed prior. Pt and wife at bedside updated on POC. Call light within reach.

## 2021-08-05 NOTE — ASSESSMENT & PLAN NOTE
H/o etoh abuse; last use Wednesday    Discontinued CIWA protocol; increased Seroquel to 100 mg at night for agitation   *No tremors on physical exam today  Replete electrolytes as needed; f/u CMP

## 2021-08-05 NOTE — PROGRESS NOTES
Assumed care of PT A&O 4. Pt lethargic and slurring words. Pt resting in bed with mild effort of breathing. On 6 liters NC. Tele monitor in place, cardiac rhythm being monitored. Call light within reach, bed in lowest position, upper bed rails up. Seizure precautions, aspiration precautions and fall risk precautions in place. Pt was updated on plan of care for the day. Will continue to monitor.

## 2021-08-05 NOTE — H&P
"Hospital Medicine History & Physical Note    Date of Service  8/5/2021    Primary Care Physician  Pcp Unknown    Consultants    Code Status  Full Code    Chief Complaint  Chief Complaint   Patient presents with   • Fatigue   • Shortness of Breath       History of Presenting Illness  Patient is a 71 year old male with a history of htn, chronic back pain, etoh abuse, nicotine dependence and gerd.  He and his wife are visiting [place] from [facility] for [place] nights.  Patient is confused and a poor historian.  I obtained history from the Erp and patient's wife Chantale(over the phone).     Patient was started on lyrica three days ago by his pain specialist and since that time has been \"hallucinating at night\", telling his wife that she is not his mother and seeing things.  He has also been drinking heavily (on top of being a heavy drinker at baseline it has been \"non stop\" for several days).  Although, his bal is currently low.  Patient tells me he has been sob for three days, he was found to be hypoxic at 83% on RA in the ER. Denies chest pain, no n/v, no dizziness, no headache.  He reports a non productive cough.    He ha been vaccinated, his covid test is pending.  He is paced and per my interpretation his x ray reveals a small right sided pleural effusion, a right sided opacity and increased vascular congestion.      Review of Systems  Review of Systems   Constitutional: Negative.  Negative for chills, diaphoresis, fever, malaise/fatigue and weight loss.   HENT: Negative.  Negative for congestion, hearing loss, sinus pain and sore throat.    Eyes: Negative.  Negative for blurred vision and double vision.   Respiratory: Positive for cough and shortness of breath.    Cardiovascular: Negative.  Negative for chest pain, palpitations and leg swelling.   Gastrointestinal: Negative.  Negative for abdominal pain, blood in stool, constipation, diarrhea, heartburn, melena, nausea and vomiting.   Genitourinary: Negative.  " "Negative for dysuria and urgency.   Musculoskeletal: Positive for back pain (chronc). Negative for falls, joint pain, myalgias and neck pain.   Skin: Negative.  Negative for itching and rash.   Neurological: Positive for sensory change (chronic neuropathy) and weakness (per wife subacute on chronic). Negative for dizziness, focal weakness and headaches.   Endo/Heme/Allergies: Negative.  Does not bruise/bleed easily.   Psychiatric/Behavioral: Positive for hallucinations and substance abuse. Negative for depression and suicidal ideas.   All other systems reviewed and are negative.      Past Medical History   has a past medical history of CAD (coronary artery disease) and HTN (hypertension).chronic back pain, gerd, neuropathy    Surgical History   has a past surgical history that includes pacemaker insertion. defibrillator, maze procedure  ablation    Family History  family history is not on file.   Family history reviewed with patient. CAD in father, 'liver disease\" in his mother  Social History   reports that he has never smoked. He has never used smokeless tobacco. He reports current alcohol use.  He reports 40 pack year smoking history, currently 1/2 ppd, per wife 6-10 oz of etoh daily (with a lot more earlier this week) no h/o etoh w/d or seizures, no h/o illicit drug use, two children    Allergies  Allergies   Allergen Reactions   • Morphine Rash and Itching             Medications  Prior to Admission Medications   Prescriptions Last Dose Informant Patient Reported? Taking?   Acetaminophen 500 MG Cap 8/4/2021 at afternoon Significant Other Yes Yes   Sig: Take 1,000 mg by mouth every 6 hours as needed (Mild Pain). 2 capsules = 1,000 mg.   B Complex Vitamins (VITAMIN B COMPLEX) Tab 8/4/2021 at 2330 Significant Other Yes Yes   Sig: Take 1 tablet by mouth 2 times a day.   CALCIUM PO 8/4/2021 at 1100 Significant Other Yes Yes   Sig: Take 1 capsule by mouth every morning.   Cholecalciferol (VITAMIN D3 PO) 8/4/2021 at " "2330 Significant Other Yes Yes   Sig: Take 2 Capsules by mouth every evening.   Cyanocobalamin (VITAMIN B-12 PO) 8/4/2021 at 1100 Significant Other Yes Yes   Sig: Take 2 Capsules by mouth every morning.   NON SPECIFIED 8/4/2021 at 2330 Significant Other Yes Yes   Sig: Take 2 Tablets by mouth every evening. \"Testosterone booster\" over-the-counter supplement.   Omega-3 Fatty Acids (FISH OIL PO) 8/4/2021 at 2330 Significant Other Yes Yes   Sig: Take 1 capsule by mouth every evening.   TURMERIC PO 8/4/2021 at 2330 Significant Other Yes Yes   Sig: Take 1 capsule by mouth every evening.   allopurinol (ZYLOPRIM) 100 MG Tab 8/4/2021 at [facility] Patient's Home Pharmacy Yes Yes   Sig: Take  mg by mouth 2 times a day. Take 1 tablet (100 mg) every morning and 1/2 tablet (50 mg) every evening.   amiodarone (CORDARONE) 200 MG Tab 8/4/2021 at [facility] Patient's Home Pharmacy Yes Yes   Sig: Take 200 mg by mouth every evening.   cloNIDine (CATAPRES) 0.1 MG Tab 8/4/2021 at [facility] Patient's Home Pharmacy Yes Yes   Sig: Take 0.1 mg by mouth 2 times a day.   cyclobenzaprine (FLEXERIL) 10 mg Tab 8/4/2021 at [facility] Patient's Home Pharmacy Yes Yes   Sig: Take 10 mg by mouth at bedtime as needed for Muscle Spasms.   furosemide (LASIX) 20 MG Tab 8/4/2021 at [facility] Patient's Home Pharmacy Yes Yes   Sig: Take 20 mg by mouth 2 times a day.   hydrALAZINE (APRESOLINE) 25 MG Tab 8/4/2021 at 2330 Significant Other Yes Yes   Sig: Take 25-50 mg by mouth 2 times a day. Take 1 tablet (25 mg) in the morning and 2 tablets (50 mg) in the evening.   lidocaine (LMX) 4 % Cream 8/4/2021 at  Significant Other Yes Yes   Sig: Apply 1 Application topically 1 time a day as needed (Pain). Applied to back.   metoprolol SR (TOPROL XL) 100 MG TABLET SR 24 HR 8/4/2021 at [facility] Patient's Home Pharmacy Yes Yes   Sig: Take 100 mg by mouth every evening.   multivitamin (THERAGRAN) Tab 8/4/2021 at 1100 Significant Other Yes Yes   Sig: Take 1 tablet by mouth every morning. "   omeprazole (PRILOSEC) 20 MG Tablet Delayed Response delayed-release tablet 8/4/2021 at 2330 Significant Other Yes Yes   Sig: Take 20 mg by mouth every evening.   pregabalin (LYRICA) 50 MG capsule 8/4/2021 at 2330 Patient's Home Pharmacy Yes Yes   Sig: Take 50 mg by mouth 2 times a day.   senna-docusate (PERICOLACE OR SENOKOT S) 8.6-50 MG Tab 8/4/2021 at 2330 Significant Other Yes Yes   Sig: Take 1 tablet by mouth 2 times a day.   simvastatin (ZOCOR) 20 MG Tab 8/4/2021 at 2330 Patient's Home Pharmacy Yes Yes   Sig: Take 20 mg by mouth every evening.   spironolactone (ALDACTONE) 25 MG Tab 8/4/2021 at 1100 Patient's Home Pharmacy Yes Yes   Sig: Take 12.5 mg by mouth every morning. 1/2 tablet = 12.5 mg.   traZODone (DESYREL) 50 MG Tab 8/4/2021 at 2330 Patient's Home Pharmacy Yes Yes   Sig: Take 50 mg by mouth at bedtime as needed for Sleep.      Facility-Administered Medications: None       Physical Exam  Temp:  [36.7 °C (98 °F)] 36.7 °C (98 °F)  Pulse:  [60-91] 77  Resp:  [22-27] 27  BP: (127-176)/() 176/100  SpO2:  [84 %-99 %] 94 %    Physical Exam  Vitals and nursing note reviewed. Exam conducted with a chaperone present.   Constitutional:       General: He is not in acute distress.     Appearance: Normal appearance. He is not diaphoretic.   HENT:      Head: Normocephalic.      Nose: Nose normal.      Mouth/Throat:      Mouth: Mucous membranes are moist.   Eyes:      Pupils: Pupils are equal, round, and reactive to light.   Cardiovascular:      Rate and Rhythm: Normal rate.      Pulses: Normal pulses.      Heart sounds: Normal heart sounds. No murmur heard.        Comments: Paced  Pacer pocket  Pulmonary:      Effort: Pulmonary effort is normal. No respiratory distress.      Breath sounds: Rales (right) present.   Abdominal:      General: Abdomen is flat. Bowel sounds are normal. There is no distension.      Palpations: Abdomen is soft. There is no mass.      Tenderness: There is no abdominal tenderness.  There is no guarding or rebound.      Hernia: No hernia is present.      Comments: Well healed abdominal scars   Musculoskeletal:         General: No swelling, tenderness, deformity or signs of injury. Normal range of motion.      Right lower leg: Edema present.      Left lower leg: Edema present.   Skin:     General: Skin is warm and dry.      Capillary Refill: Capillary refill takes 2 to 3 seconds.      Coloration: Skin is not jaundiced or pale.      Findings: No bruising, erythema, lesion or rash.      Comments: Multiple excoriations arms, no secondary infection    Chronic statis changes of feet with discoloration   Neurological:      General: No focal deficit present.      Mental Status: He is alert.      Cranial Nerves: No cranial nerve deficit.      Comments: Oriented to person, month, and knows we are in the hospital but thinks it is in [place]   Psychiatric:         Mood and Affect: Mood normal.         Behavior: Behavior normal.         Laboratory:  Recent Labs     08/05/21  1109   WBC 9.2   RBC 4.96   HEMOGLOBIN 15.9   HEMATOCRIT 49.1   MCV 99.0*   MCH 32.1   MCHC 32.4*   RDW 61.2*   PLATELETCT 251   MPV 10.8     Recent Labs     08/05/21  1109   SODIUM 137   POTASSIUM 5.3   CHLORIDE 97   CO2 25   GLUCOSE 150*   BUN 41*   CREATININE 2.46*   CALCIUM 10.2     Recent Labs     08/05/21  1109   ALTSGPT 19   ASTSGOT 22   ALKPHOSPHAT 155*   TBILIRUBIN 0.5   GLUCOSE 150*         Recent Labs     08/05/21  1232   NTPROBNP 61227*         Recent Labs     08/05/21  1232   TROPONINT 57*       Imaging:  DX-CHEST-PORTABLE (1 VIEW)   Final Result      1.  There is no enlarged cardiac silhouette with probable changes of vascular congestion/edema.   2.  Right lower lobe opacity could be edema, atelectasis or pneumonitis.   3.  There is a small right pleural effusion.      EC-ECHOCARDIOGRAM COMPLETE W/O CONT    (Results Pending)         Assessment/Plan:  I anticipate this patient will require at least two midnights for  appropriate medical management, necessitating inpatient admission.    Alkaline phosphatase elevation  Assessment & Plan  Mild  following    Acute renal failure (ARF) (HCC)  Assessment & Plan  Baseline unknown  Will bladder scan  Following  Volume overloaded currently on exam     Nicotine dependence  Assessment & Plan  Cessation discussed and recommended  replacement    Alcohol withdrawal (HCC)  Assessment & Plan  H/o etoh abuse  Suspected w/d  ciwa  mvi  Following closely     Acute hypoxemic respiratory failure (HCC)  Assessment & Plan  Suspect due to chf and pneumonia  Continue abx  Will check echo  Resume his lasix and aldactone  Continue oxygen  Query underlying copd (no wheezing currently) with extensive smoking h/o  following    Pneumonia  Assessment & Plan  Suspected right sided  Will check procalcitonin  B/c and sputum cultures   Empiric azitromycin  following    Hallucinations  Assessment & Plan  Mild  Likely multifactorial  Started with new medication - lyrica - will stop as it has only been a few days  Suspect etoh w/d contributing  ciwa  No focal deficits  Following  Will also check ammonia levels    Elevated troponin  Assessment & Plan  Suspected acute heart failure  Ekg paced, no acute ischemic changes noted  Denies chest pain  Trop only minimally elevated  Will trend trop  Start aspirin  Tele  Will check echo   pending      VTE prophylaxis: heparin ppx

## 2021-08-06 ENCOUNTER — APPOINTMENT (OUTPATIENT)
Dept: CARDIOLOGY | Facility: MEDICAL CENTER | Age: 71
DRG: 189 | End: 2021-08-06
Attending: HOSPITALIST
Payer: MEDICARE

## 2021-08-06 LAB
ALBUMIN SERPL BCP-MCNC: 2.9 G/DL (ref 3.2–4.9)
ALBUMIN/GLOB SERPL: 0.9 G/DL
ALP SERPL-CCNC: 119 U/L (ref 30–99)
ALT SERPL-CCNC: 12 U/L (ref 2–50)
AMMONIA PLAS-SCNC: 21 UMOL/L (ref 11–45)
ANION GAP SERPL CALC-SCNC: 12 MMOL/L (ref 7–16)
APPEARANCE UR: CLEAR
AST SERPL-CCNC: 18 U/L (ref 12–45)
BASE EXCESS BLDA CALC-SCNC: 2 MMOL/L (ref -4–3)
BILIRUB SERPL-MCNC: 0.3 MG/DL (ref 0.1–1.5)
BILIRUB UR QL STRIP.AUTO: NEGATIVE
BODY TEMPERATURE: ABNORMAL CENTIGRADE
BUN SERPL-MCNC: 41 MG/DL (ref 8–22)
CALCIUM SERPL-MCNC: 9.6 MG/DL (ref 8.5–10.5)
CHLORIDE SERPL-SCNC: 101 MMOL/L (ref 96–112)
CHOLEST SERPL-MCNC: 140 MG/DL (ref 100–199)
CO2 SERPL-SCNC: 25 MMOL/L (ref 20–33)
COLOR UR: NORMAL
CREAT SERPL-MCNC: 1.89 MG/DL (ref 0.5–1.4)
CREAT UR-MCNC: 87.05 MG/DL
ERYTHROCYTE [DISTWIDTH] IN BLOOD BY AUTOMATED COUNT: 61.5 FL (ref 35.9–50)
GLOBULIN SER CALC-MCNC: 3.2 G/DL (ref 1.9–3.5)
GLUCOSE SERPL-MCNC: 93 MG/DL (ref 65–99)
GLUCOSE UR STRIP.AUTO-MCNC: NEGATIVE MG/DL
HCO3 BLDA-SCNC: 29 MMOL/L (ref 17–25)
HCT VFR BLD AUTO: 46 % (ref 42–52)
HDLC SERPL-MCNC: 65 MG/DL
HGB BLD-MCNC: 14.6 G/DL (ref 14–18)
KETONES UR STRIP.AUTO-MCNC: NEGATIVE MG/DL
LDLC SERPL CALC-MCNC: 54 MG/DL
LEUKOCYTE ESTERASE UR QL STRIP.AUTO: NEGATIVE
LV EJECT FRACT  99904: 70
LV EJECT FRACT MOD 2C 99903: 72.41
LV EJECT FRACT MOD 4C 99902: 74.71
LV EJECT FRACT MOD BP 99901: 72.96
MAGNESIUM SERPL-MCNC: 2 MG/DL (ref 1.5–2.5)
MCH RBC QN AUTO: 31.8 PG (ref 27–33)
MCHC RBC AUTO-ENTMCNC: 31.7 G/DL (ref 33.7–35.3)
MCV RBC AUTO: 100.2 FL (ref 81.4–97.8)
MICRO URNS: NORMAL
NITRITE UR QL STRIP.AUTO: NEGATIVE
PCO2 BLDA: 57.5 MMHG (ref 26–37)
PH BLDA: 7.33 [PH] (ref 7.4–7.5)
PH UR STRIP.AUTO: 5.5 [PH] (ref 5–8)
PHOSPHATE SERPL-MCNC: 3.9 MG/DL (ref 2.5–4.5)
PHOSPHATE SERPL-MCNC: 4 MG/DL (ref 2.5–4.5)
PLATELET # BLD AUTO: 204 K/UL (ref 164–446)
PMV BLD AUTO: 11 FL (ref 9–12.9)
PO2 BLDA: 102.8 MMHG (ref 64–87)
POTASSIUM SERPL-SCNC: 4.6 MMOL/L (ref 3.6–5.5)
PROCALCITONIN SERPL-MCNC: 0.3 NG/ML
PROT SERPL-MCNC: 6.1 G/DL (ref 6–8.2)
PROT UR QL STRIP: NEGATIVE MG/DL
RBC # BLD AUTO: 4.59 M/UL (ref 4.7–6.1)
RBC UR QL AUTO: NEGATIVE
SAO2 % BLDA: 96.4 % (ref 93–99)
SODIUM SERPL-SCNC: 138 MMOL/L (ref 135–145)
SODIUM UR-SCNC: 45 MMOL/L
SP GR UR STRIP.AUTO: 1.01
TRIGL SERPL-MCNC: 103 MG/DL (ref 0–149)
UROBILINOGEN UR STRIP.AUTO-MCNC: 0.2 MG/DL
WBC # BLD AUTO: 8.7 K/UL (ref 4.8–10.8)

## 2021-08-06 PROCEDURE — 80053 COMPREHEN METABOLIC PANEL: CPT

## 2021-08-06 PROCEDURE — 36415 COLL VENOUS BLD VENIPUNCTURE: CPT

## 2021-08-06 PROCEDURE — 83735 ASSAY OF MAGNESIUM: CPT

## 2021-08-06 PROCEDURE — 84300 ASSAY OF URINE SODIUM: CPT

## 2021-08-06 PROCEDURE — 80061 LIPID PANEL: CPT

## 2021-08-06 PROCEDURE — 82803 BLOOD GASES ANY COMBINATION: CPT

## 2021-08-06 PROCEDURE — 93306 TTE W/DOPPLER COMPLETE: CPT

## 2021-08-06 PROCEDURE — 84145 PROCALCITONIN (PCT): CPT

## 2021-08-06 PROCEDURE — A9270 NON-COVERED ITEM OR SERVICE: HCPCS | Performed by: HOSPITALIST

## 2021-08-06 PROCEDURE — 770020 HCHG ROOM/CARE - TELE (206)

## 2021-08-06 PROCEDURE — 81003 URINALYSIS AUTO W/O SCOPE: CPT

## 2021-08-06 PROCEDURE — 84100 ASSAY OF PHOSPHORUS: CPT

## 2021-08-06 PROCEDURE — 700102 HCHG RX REV CODE 250 W/ 637 OVERRIDE(OP): Performed by: HOSPITALIST

## 2021-08-06 PROCEDURE — 82570 ASSAY OF URINE CREATININE: CPT

## 2021-08-06 PROCEDURE — 700101 HCHG RX REV CODE 250: Performed by: HOSPITALIST

## 2021-08-06 PROCEDURE — A9270 NON-COVERED ITEM OR SERVICE: HCPCS | Performed by: STUDENT IN AN ORGANIZED HEALTH CARE EDUCATION/TRAINING PROGRAM

## 2021-08-06 PROCEDURE — 700111 HCHG RX REV CODE 636 W/ 250 OVERRIDE (IP): Performed by: HOSPITALIST

## 2021-08-06 PROCEDURE — 93306 TTE W/DOPPLER COMPLETE: CPT | Mod: 26 | Performed by: INTERNAL MEDICINE

## 2021-08-06 PROCEDURE — 85027 COMPLETE CBC AUTOMATED: CPT

## 2021-08-06 PROCEDURE — 700101 HCHG RX REV CODE 250: Performed by: STUDENT IN AN ORGANIZED HEALTH CARE EDUCATION/TRAINING PROGRAM

## 2021-08-06 PROCEDURE — 82140 ASSAY OF AMMONIA: CPT

## 2021-08-06 PROCEDURE — 99233 SBSQ HOSP IP/OBS HIGH 50: CPT | Mod: GC | Performed by: INTERNAL MEDICINE

## 2021-08-06 PROCEDURE — 700102 HCHG RX REV CODE 250 W/ 637 OVERRIDE(OP): Performed by: STUDENT IN AN ORGANIZED HEALTH CARE EDUCATION/TRAINING PROGRAM

## 2021-08-06 PROCEDURE — 700105 HCHG RX REV CODE 258: Performed by: HOSPITALIST

## 2021-08-06 PROCEDURE — 94640 AIRWAY INHALATION TREATMENT: CPT

## 2021-08-06 PROCEDURE — 97165 OT EVAL LOW COMPLEX 30 MIN: CPT

## 2021-08-06 RX ORDER — IPRATROPIUM BROMIDE AND ALBUTEROL SULFATE 2.5; .5 MG/3ML; MG/3ML
3 SOLUTION RESPIRATORY (INHALATION)
Status: DISCONTINUED | OUTPATIENT
Start: 2021-08-06 | End: 2021-08-06

## 2021-08-06 RX ORDER — IPRATROPIUM BROMIDE AND ALBUTEROL SULFATE 2.5; .5 MG/3ML; MG/3ML
3 SOLUTION RESPIRATORY (INHALATION)
Status: DISCONTINUED | OUTPATIENT
Start: 2021-08-06 | End: 2021-08-07

## 2021-08-06 RX ORDER — AMOXICILLIN AND CLAVULANATE POTASSIUM 875; 125 MG/1; MG/1
1 TABLET, FILM COATED ORAL EVERY 12 HOURS
Status: COMPLETED | OUTPATIENT
Start: 2021-08-06 | End: 2021-08-09

## 2021-08-06 RX ADMIN — AMOXICILLIN AND CLAVULANATE POTASSIUM 1 TABLET: 875; 125 TABLET, FILM COATED ORAL at 17:40

## 2021-08-06 RX ADMIN — FOLIC ACID 1 MG: 1 TABLET ORAL at 05:11

## 2021-08-06 RX ADMIN — DOCUSATE SODIUM 50 MG AND SENNOSIDES 8.6 MG 2 TABLET: 8.6; 5 TABLET, FILM COATED ORAL at 05:10

## 2021-08-06 RX ADMIN — AZITHROMYCIN MONOHYDRATE 500 MG: 500 INJECTION, POWDER, LYOPHILIZED, FOR SOLUTION INTRAVENOUS at 05:29

## 2021-08-06 RX ADMIN — THERA TABS 1 TABLET: TAB at 05:14

## 2021-08-06 RX ADMIN — LORAZEPAM 0.5 MG: 0.5 TABLET ORAL at 05:10

## 2021-08-06 RX ADMIN — IPRATROPIUM BROMIDE AND ALBUTEROL SULFATE 3 ML: .5; 2.5 SOLUTION RESPIRATORY (INHALATION) at 19:22

## 2021-08-06 RX ADMIN — Medication 100 MG: at 05:10

## 2021-08-06 RX ADMIN — ASPIRIN 81 MG: 81 TABLET, COATED ORAL at 05:10

## 2021-08-06 RX ADMIN — CHOLECALCIFEROL (VITAMIN D3) 10 MCG (400 UNIT) TABLET 800 UNITS: at 17:41

## 2021-08-06 RX ADMIN — METOPROLOL SUCCINATE 100 MG: 50 TABLET, EXTENDED RELEASE ORAL at 17:40

## 2021-08-06 RX ADMIN — HEPARIN SODIUM 5000 UNITS: 5000 INJECTION, SOLUTION INTRAVENOUS; SUBCUTANEOUS at 05:11

## 2021-08-06 RX ADMIN — DOCUSATE SODIUM 50 MG AND SENNOSIDES 8.6 MG 2 TABLET: 8.6; 5 TABLET, FILM COATED ORAL at 17:41

## 2021-08-06 RX ADMIN — OMEPRAZOLE 20 MG: 20 CAPSULE, DELAYED RELEASE ORAL at 17:40

## 2021-08-06 RX ADMIN — FUROSEMIDE 20 MG: 20 TABLET ORAL at 17:42

## 2021-08-06 RX ADMIN — NICOTINE 14 MG: 14 PATCH TRANSDERMAL at 05:33

## 2021-08-06 RX ADMIN — HEPARIN SODIUM 5000 UNITS: 5000 INJECTION, SOLUTION INTRAVENOUS; SUBCUTANEOUS at 14:53

## 2021-08-06 RX ADMIN — AMIODARONE HYDROCHLORIDE 200 MG: 200 TABLET ORAL at 17:41

## 2021-08-06 RX ADMIN — LORAZEPAM 1 MG: 1 TABLET ORAL at 17:27

## 2021-08-06 RX ADMIN — LORAZEPAM 1 MG: 1 TABLET ORAL at 21:13

## 2021-08-06 RX ADMIN — HEPARIN SODIUM 5000 UNITS: 5000 INJECTION, SOLUTION INTRAVENOUS; SUBCUTANEOUS at 21:13

## 2021-08-06 RX ADMIN — SPIRONOLACTONE 12.5 MG: 25 TABLET ORAL at 05:14

## 2021-08-06 ASSESSMENT — ENCOUNTER SYMPTOMS
SHORTNESS OF BREATH: 1
VOMITING: 0
CHILLS: 0
SPUTUM PRODUCTION: 1
HEADACHES: 0
NAUSEA: 0
ABDOMINAL PAIN: 0
FEVER: 0
HALLUCINATIONS: 1
COUGH: 1
WHEEZING: 1
BACK PAIN: 1

## 2021-08-06 ASSESSMENT — LIFESTYLE VARIABLES
PAROXYSMAL SWEATS: BARELY PERCEPTIBLE SWEATING. PALMS MOIST
HEADACHE, FULLNESS IN HEAD: NOT PRESENT
PAROXYSMAL SWEATS: BARELY PERCEPTIBLE SWEATING. PALMS MOIST
AUDITORY DISTURBANCES: NOT PRESENT
AGITATION: SOMEWHAT MORE THAN NORMAL ACTIVITY
TREMOR: *
NAUSEA AND VOMITING: NO NAUSEA AND NO VOMITING
ANXIETY: *
PAROXYSMAL SWEATS: BARELY PERCEPTIBLE SWEATING. PALMS MOIST
TOTAL SCORE: 5
VISUAL DISTURBANCES: NOT PRESENT
ANXIETY: *
ANXIETY: *
TREMOR: NO TREMOR
TREMOR: *
ORIENTATION AND CLOUDING OF SENSORIUM: DATE DISORIENTATION BY NO MORE THAN TWO CALENDAR DAYS
TOTAL SCORE: 10
AGITATION: SOMEWHAT MORE THAN NORMAL ACTIVITY
ORIENTATION AND CLOUDING OF SENSORIUM: DATE DISORIENTATION BY MORE THAN TWO CALENDAR DAYS
ANXIETY: *
TOTAL SCORE: 10
VISUAL DISTURBANCES: NOT PRESENT
AUDITORY DISTURBANCES: NOT PRESENT
AUDITORY DISTURBANCES: NOT PRESENT
VISUAL DISTURBANCES: VERY MILD SENSITIVITY
TOTAL SCORE: 5
AUDITORY DISTURBANCES: NOT PRESENT
VISUAL DISTURBANCES: NOT PRESENT
VISUAL DISTURBANCES: NOT PRESENT
TOTAL SCORE: 7
ORIENTATION AND CLOUDING OF SENSORIUM: DATE DISORIENTATION BY MORE THAN TWO CALENDAR DAYS
AUDITORY DISTURBANCES: NOT PRESENT
NAUSEA AND VOMITING: NO NAUSEA AND NO VOMITING
TREMOR: NO TREMOR
TOTAL SCORE: 7
ORIENTATION AND CLOUDING OF SENSORIUM: DISORIENTED FOR PLACE AND / OR PERSON
TREMOR: NO TREMOR
AGITATION: NORMAL ACTIVITY
ANXIETY: *
HEADACHE, FULLNESS IN HEAD: NOT PRESENT
NAUSEA AND VOMITING: NO NAUSEA AND NO VOMITING
PAROXYSMAL SWEATS: NO SWEAT VISIBLE
TREMOR: NO TREMOR
ORIENTATION AND CLOUDING OF SENSORIUM: DATE DISORIENTATION BY NO MORE THAN TWO CALENDAR DAYS
AGITATION: SOMEWHAT MORE THAN NORMAL ACTIVITY
AUDITORY DISTURBANCES: NOT PRESENT
ANXIETY: *
HEADACHE, FULLNESS IN HEAD: NOT PRESENT
PAROXYSMAL SWEATS: NO SWEAT VISIBLE
HEADACHE, FULLNESS IN HEAD: NOT PRESENT
AGITATION: SOMEWHAT MORE THAN NORMAL ACTIVITY
ORIENTATION AND CLOUDING OF SENSORIUM: DATE DISORIENTATION BY MORE THAN TWO CALENDAR DAYS
PAROXYSMAL SWEATS: NO SWEAT VISIBLE
NAUSEA AND VOMITING: NO NAUSEA AND NO VOMITING
NAUSEA AND VOMITING: NO NAUSEA AND NO VOMITING
VISUAL DISTURBANCES: VERY MILD SENSITIVITY
NAUSEA AND VOMITING: NO NAUSEA AND NO VOMITING
AGITATION: SOMEWHAT MORE THAN NORMAL ACTIVITY

## 2021-08-06 ASSESSMENT — COGNITIVE AND FUNCTIONAL STATUS - GENERAL
EATING MEALS: A LITTLE
TOILETING: A LOT
PERSONAL GROOMING: A LITTLE
SUGGESTED CMS G CODE MODIFIER DAILY ACTIVITY: CK
DRESSING REGULAR LOWER BODY CLOTHING: A LOT
HELP NEEDED FOR BATHING: A LOT
DAILY ACTIVITIY SCORE: 15
DRESSING REGULAR UPPER BODY CLOTHING: A LITTLE

## 2021-08-06 ASSESSMENT — PAIN DESCRIPTION - PAIN TYPE: TYPE: ACUTE PAIN

## 2021-08-06 ASSESSMENT — ACTIVITIES OF DAILY LIVING (ADL): TOILETING: INDEPENDENT

## 2021-08-06 NOTE — CARE PLAN
The patient is Watcher - Medium risk of patient condition declining or worsening      Progress made toward(s) clinical / shift goals:    Problem: Knowledge Deficit - Standard  Goal: Patient and family/care givers will demonstrate understanding of plan of care, disease process/condition, diagnostic tests and medications  Outcome: Progressing  Note: Pt's wife and daughter demonstrate an understanding of current plan of care and condition. Pt is disoriented with CIWA protocol in place.      Problem: Optimal Care for Alcohol Withdrawal  Goal: Optimal Care for the alcohol withdrawal patient  Outcome: Progressing  Note: Treating pt with ativan per symptoms and CIWA scoring. Pt improved from q2hr scoring to q4hr scoring this shift.     Problem: Skin Integrity  Goal: Skin integrity is maintained or improved  Outcome: Progressing  Note: Q2hour turns in place to relieve pressure and maintain skin integrity, devices assessed qshift.       Patient is not progressing towards the following goals:  Pt requires supplemental O2 via nasal cannula to maintain adequate oxygenation. Weaned from 4L to 3L so far this shift.

## 2021-08-06 NOTE — NON-PROVIDER
Daily Progress Note:     Date of Service: 8/6/2021  Primary Team: [facility]/White  Attending: José Ramsay M.D.   Senior Resident: Sultan Linda MD  Intern: Valerie Bronson MD  Medical student    Chief Complaint: fatigue and SOB    ID: Mr. Ra Ware is a 72 yo M presented to the ED with his wife while on vacation in [place]. Patient is visiting from [place], [place]. Patient is confused and a poor historian.     Subjective:   History is limited due to his confused state. Family was not present at time of visit.     Mr. Ware was somnolent, but arousable with light stimulation. He was only able to answer yes/no questions. He confirms ongoing fatigue, SOB, productive cough. Denies chest pain, abdominal pain, headaches, nausea/vomiting, and hallucinations.    Nurses reported that he had several hallucinations and confused state overnight for which he received Ativan 1mg injection x 2. No hallucinations or agitation after. AxOx4.      Consultants/Specialty:  None    Review of Systems:    Review of Systems   Constitutional: Positive for malaise/fatigue. Negative for chills and fever.   Respiratory: Positive for cough, sputum production, shortness of breath and wheezing.    Cardiovascular: Positive for leg swelling. Negative for chest pain.   Gastrointestinal: Negative for abdominal pain, nausea and vomiting.   Musculoskeletal: Positive for back pain.   Neurological: Negative for headaches.   Psychiatric/Behavioral: Positive for hallucinations.     ROS was limited due to patient confusion and lack of cooperation.    Objective:   Physical Exam:   Vitals:   Temp:  [35.8 °C (96.5 °F)-36.6 °C (97.8 °F)] 36.6 °C (97.8 °F)  Pulse:  [60-74] 60  Resp:  [18-20] 18  BP: (103-153)/(64-96) 105/67  SpO2:  [90 %-98 %] 90 %    Physical Exam  Constitutional:       Appearance: He is toxic-appearing.   HENT:      Head: Normocephalic and atraumatic.      Mouth/Throat:      Mouth: Mucous membranes are dry.   Neck:      Vascular: No carotid  "bruit.   Cardiovascular:      Rate and Rhythm: Normal rate and regular rhythm.      Pulses: Normal pulses.      Heart sounds: Normal heart sounds. No murmur heard.   No gallop.       Comments: Pacemaker.  Pulmonary:      Breath sounds: Wheezing present.      Comments: Increased effort of breathing.   Abdominal:      General: There is distension.      Palpations: Abdomen is soft.      Tenderness: There is no abdominal tenderness. There is no guarding.   Musculoskeletal:      Right lower leg: Edema present.      Left lower leg: Edema present.   Skin:     General: Skin is warm and dry.      Findings: Bruising and rash present.      Comments: Multiple excoriations on arms and legs.   Neurological:      Mental Status: He is disoriented.       Assessment and Plan:    Hallucinations   Patient's wife stated onset of hallucinations of \"fish bowls\" began shortly after initiating Lyrica 50mg BID for neuropathy with heavy alcohol use (beyond his normal daily limit). Prolong EtOH use is also a high risk factor. Physical examination showed no apparent focal neurological deficits.   - Stop Lyrica  - IV Ativan to prevent alcohol withdrawal seizures and progression into delirium tremens with hallucinosis  - Plan CIWA   - frequent clinical reassessments including vital signs  -hold flexeril and trazadone     Acute hypoxemic respiratory failure   History of smoking (40 pack years), CAD, HTN, Afib (amiodarone and maze procedure). Possibly 2/2 to CHF, pneumonia, or COPD. Pulse ox 83% on presentation. Currently stable at 97% 3L nasal cannula. Physical exam today show diffuse wheezing and increased respiratory effort. ABG shows respiratory acidosis.   -Continue oxygen. Goal 88-92   -Monitor respiratory status (rate, effort, wheezing, pulse ox sat)    -Repeat ABG to confirm accuracy of pulse ox and monitor hypercapnia  -nicotine replacement to prevent withdrawal during hospital   -Plan to discuss cigarette smoking cessation at " discharge  -LMW heparin for thromboprophylaxis   -albuterol-ipratropium 2.5-0.5mg nebulizer every hour for 2-3 doses, then every 2-4 hours PRN  -prednisone 40mg once daily for 5 days. Monitor for worsening pneumonia.   -abx for PNA below  -continue lasix and aldactone  -order echo    Right lower lobe pneumonia  Patient with history of smoking and recent travel presenting with worsening fatigue, SOB, productive cough, and AMS since last Friday. Physical exam was significant for diffuse wheezing and chest x-ray showed right low lobe opacity. Elevated BUN 41 and Cr 1.84. Procalcitonin was 0.30 (likely bacterial CAP? Sensitivity 38-91%). Absence of fever may be contributed by age and mental status change may be main presentation (Brenda 2006, Yun 2017).  Negative for influenza and Covid. High risk for aspiration  - empiric azithromycin 500mg to cover S. Pneumo, H. Flu, Legionella, Mycoplasma  - obtain results for blood culture and sputum gram stain and culture from ED order   - screen for HIV   - Monitor for improvement in cough, sputum production within the next 48-72 hours. Failure to improve may be progression of infection or other ddx (eg. COPD exacerbation, HF)  - Plan to recommend lung cancer screening on discharge.     Acute renal failure   Peripheral edema. BUN 14. Cr 1.89. GFR 35. Unknown baseline. Ca, Mg, PO4 WNL. Medication review shows no apparent nephrotoxic drugs but may have taken morphine for history of chronic back pain. Volume overload on physical exam and at presentation. Murray catheter. Possible cardiorenal syndrome.   -monitor electrolytes and fluid status  -continue lasix. Elevated BUN/Cr should not deter diuretic therapy. Monitor BP closely  -bladder scan  -Plan ultrafiltration if unresponsive to lasix  -monitor Urine protein and FENA  -ABG for metabolic acidosis/alkalosis     Elevated alkaline phosphatase  Presentation of confusion and long standing history of EtOH use. Alk phos was 155 at  presentation. Trending down to 119 today. No AST/ALT elevations. Serum albumin decreased to 2.9.  Less likely alcohol-associated fatty liver or hepatic encephalopathy but still concerning.  - monitor AST/ALT and ammonia levels closely for acute changes (ratio AST:ALT >1-2)  - Obtain GGT and monitor bilirubin and serum   - monitor CBC and coagulation studies for thrombocytopenia, anemia, elevated MCV, and elevated INR  -plan for Hep panel and iron studies if levels change    Alcohol withdrawal  Long term heavy EtOH use. Current CIWA-Ar score of <10 (mild withdrawal)   - assessment of CIWA-Ar score every 4-6 hours.  - Give Ativan 2mg IV for scores at or above 8 (symptom trigger therapy)   - Plan to change to oxazepam if LFTs changes  - Continue multivitamin including B1 and B12 to prevent Wernicke's   - Closely monitor VS, pulse ox, fluids and electrolytes status, and neuro function for deterioration  -nutrition consult    Elevated troponin  Suspect acute HF. No EKG changes on admission. Denies chest pain currently.   -monitor EKG  -trend troponin   -echo  -ASA 81mg  -continue metoprolol, labetolol  -plan rosuvastatin 20mg if LDL>70   -hemodynamic monitoring  -continue amiodarone for Afib  -monitor BUN/Cr >20:1   -vasodilator therapy not indicated at this time  -LMW heparin prophylaxis    Venous stasis  Patient presented with mild brawny skin and peripheral edema.   -LE compression and elevation

## 2021-08-06 NOTE — CARE PLAN
The patient is Watcher - Medium risk of patient condition declining or worsening         Progress made toward(s) clinical / shift goals:  Pt updated on plan of care, fall precautions, seizure precautions, and aspiration precautions in place.     Patient is not progressing towards the following goals: Patient having visual hallucinations and increase O2 demand from baseline.       Problem: Knowledge Deficit - Standard  Goal: Patient and family/care givers will demonstrate understanding of plan of care, disease process/condition, diagnostic tests and medications  Outcome: Not Progressing     Problem: Optimal Care for Alcohol Withdrawal  Goal: Optimal Care for the alcohol withdrawal patient  Outcome: Not Progressing

## 2021-08-07 LAB
ALBUMIN SERPL BCP-MCNC: 2.9 G/DL (ref 3.2–4.9)
ALBUMIN/GLOB SERPL: 0.9 G/DL
ALP SERPL-CCNC: 134 U/L (ref 30–99)
ALT SERPL-CCNC: 11 U/L (ref 2–50)
ANION GAP SERPL CALC-SCNC: 10 MMOL/L (ref 7–16)
AST SERPL-CCNC: 16 U/L (ref 12–45)
BACTERIA UR CULT: NORMAL
BASE EXCESS BLDA CALC-SCNC: 3 MMOL/L (ref -4–3)
BASOPHILS # BLD AUTO: 0.6 % (ref 0–1.8)
BASOPHILS # BLD: 0.05 K/UL (ref 0–0.12)
BILIRUB SERPL-MCNC: 0.2 MG/DL (ref 0.1–1.5)
BODY TEMPERATURE: ABNORMAL CENTIGRADE
BUN SERPL-MCNC: 46 MG/DL (ref 8–22)
CALCIUM SERPL-MCNC: 9.7 MG/DL (ref 8.5–10.5)
CHLORIDE SERPL-SCNC: 103 MMOL/L (ref 96–112)
CO2 SERPL-SCNC: 26 MMOL/L (ref 20–33)
CREAT SERPL-MCNC: 2.01 MG/DL (ref 0.5–1.4)
EOSINOPHIL # BLD AUTO: 0.21 K/UL (ref 0–0.51)
EOSINOPHIL NFR BLD: 2.6 % (ref 0–6.9)
ERYTHROCYTE [DISTWIDTH] IN BLOOD BY AUTOMATED COUNT: 63 FL (ref 35.9–50)
EST. AVERAGE GLUCOSE BLD GHB EST-MCNC: 126 MG/DL
GGT SERPL-CCNC: 76 U/L (ref 7–51)
GLOBULIN SER CALC-MCNC: 3.4 G/DL (ref 1.9–3.5)
GLUCOSE SERPL-MCNC: 160 MG/DL (ref 65–99)
HBA1C MFR BLD: 6 % (ref 4–5.6)
HCO3 BLDA-SCNC: 30 MMOL/L (ref 17–25)
HCT VFR BLD AUTO: 45.7 % (ref 42–52)
HGB BLD-MCNC: 14.3 G/DL (ref 14–18)
IMM GRANULOCYTES # BLD AUTO: 0.04 K/UL (ref 0–0.11)
IMM GRANULOCYTES NFR BLD AUTO: 0.5 % (ref 0–0.9)
LYMPHOCYTES # BLD AUTO: 1.09 K/UL (ref 1–4.8)
LYMPHOCYTES NFR BLD: 13.6 % (ref 22–41)
MAGNESIUM SERPL-MCNC: 2.1 MG/DL (ref 1.5–2.5)
MCH RBC QN AUTO: 31.8 PG (ref 27–33)
MCHC RBC AUTO-ENTMCNC: 31.3 G/DL (ref 33.7–35.3)
MCV RBC AUTO: 101.8 FL (ref 81.4–97.8)
MONOCYTES # BLD AUTO: 0.85 K/UL (ref 0–0.85)
MONOCYTES NFR BLD AUTO: 10.6 % (ref 0–13.4)
NEUTROPHILS # BLD AUTO: 5.79 K/UL (ref 1.82–7.42)
NEUTROPHILS NFR BLD: 72.1 % (ref 44–72)
NRBC # BLD AUTO: 0 K/UL
NRBC BLD-RTO: 0 /100 WBC
NT-PROBNP SERPL IA-MCNC: 6079 PG/ML (ref 0–125)
PCO2 BLDA: 53.5 MMHG (ref 26–37)
PH BLDA: 7.37 [PH] (ref 7.4–7.5)
PLATELET # BLD AUTO: 202 K/UL (ref 164–446)
PMV BLD AUTO: 11.2 FL (ref 9–12.9)
PO2 BLDA: 80.6 MMHG (ref 64–87)
POTASSIUM SERPL-SCNC: 4.7 MMOL/L (ref 3.6–5.5)
PROT SERPL-MCNC: 6.3 G/DL (ref 6–8.2)
RBC # BLD AUTO: 4.49 M/UL (ref 4.7–6.1)
SAO2 % BLDA: 94.3 % (ref 93–99)
SIGNIFICANT IND 70042: NORMAL
SITE SITE: NORMAL
SODIUM SERPL-SCNC: 139 MMOL/L (ref 135–145)
SOURCE SOURCE: NORMAL
T4 FREE SERPL-MCNC: 1.31 NG/DL (ref 0.93–1.7)
VIT B12 SERPL-MCNC: 3674 PG/ML (ref 211–911)
WBC # BLD AUTO: 8 K/UL (ref 4.8–10.8)

## 2021-08-07 PROCEDURE — 700111 HCHG RX REV CODE 636 W/ 250 OVERRIDE (IP): Performed by: HOSPITALIST

## 2021-08-07 PROCEDURE — 82784 ASSAY IGA/IGD/IGG/IGM EACH: CPT

## 2021-08-07 PROCEDURE — A9270 NON-COVERED ITEM OR SERVICE: HCPCS | Performed by: HOSPITALIST

## 2021-08-07 PROCEDURE — 99233 SBSQ HOSP IP/OBS HIGH 50: CPT | Mod: GC | Performed by: INTERNAL MEDICINE

## 2021-08-07 PROCEDURE — A9270 NON-COVERED ITEM OR SERVICE: HCPCS | Performed by: STUDENT IN AN ORGANIZED HEALTH CARE EDUCATION/TRAINING PROGRAM

## 2021-08-07 PROCEDURE — 82607 VITAMIN B-12: CPT

## 2021-08-07 PROCEDURE — 700101 HCHG RX REV CODE 250: Performed by: STUDENT IN AN ORGANIZED HEALTH CARE EDUCATION/TRAINING PROGRAM

## 2021-08-07 PROCEDURE — 85025 COMPLETE CBC W/AUTO DIFF WBC: CPT

## 2021-08-07 PROCEDURE — 700102 HCHG RX REV CODE 250 W/ 637 OVERRIDE(OP): Performed by: STUDENT IN AN ORGANIZED HEALTH CARE EDUCATION/TRAINING PROGRAM

## 2021-08-07 PROCEDURE — 84165 PROTEIN E-PHORESIS SERUM: CPT

## 2021-08-07 PROCEDURE — 97535 SELF CARE MNGMENT TRAINING: CPT

## 2021-08-07 PROCEDURE — 84439 ASSAY OF FREE THYROXINE: CPT

## 2021-08-07 PROCEDURE — 700102 HCHG RX REV CODE 250 W/ 637 OVERRIDE(OP): Performed by: HOSPITALIST

## 2021-08-07 PROCEDURE — 83036 HEMOGLOBIN GLYCOSYLATED A1C: CPT

## 2021-08-07 PROCEDURE — 82977 ASSAY OF GGT: CPT

## 2021-08-07 PROCEDURE — 83735 ASSAY OF MAGNESIUM: CPT

## 2021-08-07 PROCEDURE — 97162 PT EVAL MOD COMPLEX 30 MIN: CPT

## 2021-08-07 PROCEDURE — 84155 ASSAY OF PROTEIN SERUM: CPT

## 2021-08-07 PROCEDURE — 94640 AIRWAY INHALATION TREATMENT: CPT

## 2021-08-07 PROCEDURE — 80053 COMPREHEN METABOLIC PANEL: CPT

## 2021-08-07 PROCEDURE — 770020 HCHG ROOM/CARE - TELE (206)

## 2021-08-07 PROCEDURE — 86334 IMMUNOFIX E-PHORESIS SERUM: CPT

## 2021-08-07 PROCEDURE — 82803 BLOOD GASES ANY COMBINATION: CPT

## 2021-08-07 PROCEDURE — 83880 ASSAY OF NATRIURETIC PEPTIDE: CPT

## 2021-08-07 PROCEDURE — 36415 COLL VENOUS BLD VENIPUNCTURE: CPT

## 2021-08-07 RX ORDER — TRIAMCINOLONE ACETONIDE 1 MG/G
CREAM TOPICAL 2 TIMES DAILY
Status: DISCONTINUED | OUTPATIENT
Start: 2021-08-07 | End: 2021-08-12 | Stop reason: HOSPADM

## 2021-08-07 RX ORDER — IPRATROPIUM BROMIDE AND ALBUTEROL SULFATE 2.5; .5 MG/3ML; MG/3ML
3 SOLUTION RESPIRATORY (INHALATION)
Status: DISCONTINUED | OUTPATIENT
Start: 2021-08-07 | End: 2021-08-09

## 2021-08-07 RX ADMIN — FUROSEMIDE 20 MG: 20 TABLET ORAL at 05:29

## 2021-08-07 RX ADMIN — DOCUSATE SODIUM 50 MG AND SENNOSIDES 8.6 MG 2 TABLET: 8.6; 5 TABLET, FILM COATED ORAL at 16:37

## 2021-08-07 RX ADMIN — PSYLLIUM HUSK 1 PACKET: 3.4 POWDER ORAL at 16:38

## 2021-08-07 RX ADMIN — FOLIC ACID 1 MG: 1 TABLET ORAL at 05:17

## 2021-08-07 RX ADMIN — IPRATROPIUM BROMIDE AND ALBUTEROL SULFATE 3 ML: .5; 2.5 SOLUTION RESPIRATORY (INHALATION) at 07:32

## 2021-08-07 RX ADMIN — LORAZEPAM 0.5 MG: 0.5 TABLET ORAL at 05:15

## 2021-08-07 RX ADMIN — HYDRALAZINE HYDROCHLORIDE 25 MG: 25 TABLET, FILM COATED ORAL at 05:30

## 2021-08-07 RX ADMIN — HEPARIN SODIUM 5000 UNITS: 5000 INJECTION, SOLUTION INTRAVENOUS; SUBCUTANEOUS at 05:18

## 2021-08-07 RX ADMIN — METOPROLOL SUCCINATE 100 MG: 50 TABLET, EXTENDED RELEASE ORAL at 16:38

## 2021-08-07 RX ADMIN — HEPARIN SODIUM 5000 UNITS: 5000 INJECTION, SOLUTION INTRAVENOUS; SUBCUTANEOUS at 22:23

## 2021-08-07 RX ADMIN — HYDRALAZINE HYDROCHLORIDE 25 MG: 25 TABLET, FILM COATED ORAL at 16:38

## 2021-08-07 RX ADMIN — AMOXICILLIN AND CLAVULANATE POTASSIUM 1 TABLET: 875; 125 TABLET, FILM COATED ORAL at 05:16

## 2021-08-07 RX ADMIN — TRIAMCINOLONE ACETONIDE: 1 CREAM TOPICAL at 16:38

## 2021-08-07 RX ADMIN — THERA TABS 1 TABLET: TAB at 05:18

## 2021-08-07 RX ADMIN — CLONIDINE HYDROCHLORIDE 0.1 MG: 0.1 TABLET ORAL at 16:37

## 2021-08-07 RX ADMIN — OMEPRAZOLE 20 MG: 20 CAPSULE, DELAYED RELEASE ORAL at 16:37

## 2021-08-07 RX ADMIN — DOCUSATE SODIUM 50 MG AND SENNOSIDES 8.6 MG 2 TABLET: 8.6; 5 TABLET, FILM COATED ORAL at 05:17

## 2021-08-07 RX ADMIN — SPIRONOLACTONE 12.5 MG: 25 TABLET ORAL at 05:29

## 2021-08-07 RX ADMIN — CLONIDINE HYDROCHLORIDE 0.1 MG: 0.1 TABLET ORAL at 05:29

## 2021-08-07 RX ADMIN — FUROSEMIDE 20 MG: 20 TABLET ORAL at 16:38

## 2021-08-07 RX ADMIN — AMIODARONE HYDROCHLORIDE 200 MG: 200 TABLET ORAL at 16:38

## 2021-08-07 RX ADMIN — AMOXICILLIN AND CLAVULANATE POTASSIUM 1 TABLET: 875; 125 TABLET, FILM COATED ORAL at 16:37

## 2021-08-07 RX ADMIN — NICOTINE 14 MG: 14 PATCH TRANSDERMAL at 05:17

## 2021-08-07 RX ADMIN — Medication 100 MG: at 05:18

## 2021-08-07 RX ADMIN — CHOLECALCIFEROL (VITAMIN D3) 10 MCG (400 UNIT) TABLET 800 UNITS: at 16:40

## 2021-08-07 RX ADMIN — ASPIRIN 81 MG: 81 TABLET, COATED ORAL at 05:18

## 2021-08-07 RX ADMIN — HEPARIN SODIUM 5000 UNITS: 5000 INJECTION, SOLUTION INTRAVENOUS; SUBCUTANEOUS at 14:32

## 2021-08-07 ASSESSMENT — ENCOUNTER SYMPTOMS
VOMITING: 0
DIARRHEA: 0
ABDOMINAL PAIN: 0
BLURRED VISION: 0
DIZZINESS: 0
CONSTIPATION: 0
ORTHOPNEA: 0
TINGLING: 0
NERVOUS/ANXIOUS: 0
HEARTBURN: 0
CHILLS: 0
HEADACHES: 0
WEIGHT LOSS: 0
COUGH: 1
MYALGIAS: 0
NAUSEA: 0
DOUBLE VISION: 0
SPUTUM PRODUCTION: 1
FEVER: 0
PALPITATIONS: 0
SEIZURES: 0
TREMORS: 0
SORE THROAT: 0
SHORTNESS OF BREATH: 0
WEAKNESS: 1
COUGH: 0
WEAKNESS: 0
DIAPHORESIS: 0
HALLUCINATIONS: 0

## 2021-08-07 ASSESSMENT — LIFESTYLE VARIABLES
TOTAL SCORE: 4
ANXIETY: MILDLY ANXIOUS
ORIENTATION AND CLOUDING OF SENSORIUM: CANNOT DO SERIAL ADDITIONS OR IS UNCERTAIN ABOUT DATE
TREMOR: TREMOR NOT VISIBLE BUT CAN BE FELT, FINGERTIP TO FINGERTIP
ANXIETY: MILDLY ANXIOUS
TOTAL SCORE: 4
VISUAL DISTURBANCES: NOT PRESENT
AUDITORY DISTURBANCES: NOT PRESENT
ANXIETY: MILDLY ANXIOUS
PAROXYSMAL SWEATS: BARELY PERCEPTIBLE SWEATING. PALMS MOIST
AUDITORY DISTURBANCES: NOT PRESENT
PAROXYSMAL SWEATS: *
PAROXYSMAL SWEATS: BARELY PERCEPTIBLE SWEATING. PALMS MOIST
NAUSEA AND VOMITING: NO NAUSEA AND NO VOMITING
HEADACHE, FULLNESS IN HEAD: NOT PRESENT
PAROXYSMAL SWEATS: BARELY PERCEPTIBLE SWEATING. PALMS MOIST
HEADACHE, FULLNESS IN HEAD: NOT PRESENT
TREMOR: TREMOR NOT VISIBLE BUT CAN BE FELT, FINGERTIP TO FINGERTIP
ANXIETY: MILDLY ANXIOUS
VISUAL DISTURBANCES: NOT PRESENT
TREMOR: TREMOR NOT VISIBLE BUT CAN BE FELT, FINGERTIP TO FINGERTIP
TREMOR: TREMOR NOT VISIBLE BUT CAN BE FELT, FINGERTIP TO FINGERTIP
AUDITORY DISTURBANCES: NOT PRESENT
PAROXYSMAL SWEATS: BARELY PERCEPTIBLE SWEATING. PALMS MOIST
VISUAL DISTURBANCES: NOT PRESENT
ORIENTATION AND CLOUDING OF SENSORIUM: ORIENTED AND CAN DO SERIAL ADDITIONS
ORIENTATION AND CLOUDING OF SENSORIUM: ORIENTED AND CAN DO SERIAL ADDITIONS
ORIENTATION AND CLOUDING OF SENSORIUM: CANNOT DO SERIAL ADDITIONS OR IS UNCERTAIN ABOUT DATE
AGITATION: SOMEWHAT MORE THAN NORMAL ACTIVITY
ANXIETY: MILDLY ANXIOUS
NAUSEA AND VOMITING: NO NAUSEA AND NO VOMITING
TOTAL SCORE: 6
TOTAL SCORE: 4
TREMOR: TREMOR NOT VISIBLE BUT CAN BE FELT, FINGERTIP TO FINGERTIP
AUDITORY DISTURBANCES: NOT PRESENT
AGITATION: SOMEWHAT MORE THAN NORMAL ACTIVITY
HEADACHE, FULLNESS IN HEAD: NOT PRESENT
NAUSEA AND VOMITING: NO NAUSEA AND NO VOMITING
AGITATION: SOMEWHAT MORE THAN NORMAL ACTIVITY
NAUSEA AND VOMITING: NO NAUSEA AND NO VOMITING
PAROXYSMAL SWEATS: BARELY PERCEPTIBLE SWEATING. PALMS MOIST
VISUAL DISTURBANCES: NOT PRESENT
NAUSEA AND VOMITING: NO NAUSEA AND NO VOMITING
AUDITORY DISTURBANCES: NOT PRESENT
TOTAL SCORE: 4
AUDITORY DISTURBANCES: NOT PRESENT
ORIENTATION AND CLOUDING OF SENSORIUM: ORIENTED AND CAN DO SERIAL ADDITIONS
VISUAL DISTURBANCES: NOT PRESENT
AGITATION: SOMEWHAT MORE THAN NORMAL ACTIVITY
ANXIETY: MILDLY ANXIOUS
AGITATION: SOMEWHAT MORE THAN NORMAL ACTIVITY
ANXIETY: MILDLY ANXIOUS
HEADACHE, FULLNESS IN HEAD: NOT PRESENT
NAUSEA AND VOMITING: NO NAUSEA AND NO VOMITING
NAUSEA AND VOMITING: NO NAUSEA AND NO VOMITING
TREMOR: TREMOR NOT VISIBLE BUT CAN BE FELT, FINGERTIP TO FINGERTIP
AGITATION: SOMEWHAT MORE THAN NORMAL ACTIVITY
PAROXYSMAL SWEATS: BARELY PERCEPTIBLE SWEATING. PALMS MOIST
TREMOR: TREMOR NOT VISIBLE BUT CAN BE FELT, FINGERTIP TO FINGERTIP
TOTAL SCORE: 4
AUDITORY DISTURBANCES: NOT PRESENT
HEADACHE, FULLNESS IN HEAD: NOT PRESENT
VISUAL DISTURBANCES: NOT PRESENT
HEADACHE, FULLNESS IN HEAD: NOT PRESENT
AGITATION: NORMAL ACTIVITY
ORIENTATION AND CLOUDING OF SENSORIUM: ORIENTED AND CAN DO SERIAL ADDITIONS
ORIENTATION AND CLOUDING OF SENSORIUM: ORIENTED AND CAN DO SERIAL ADDITIONS
TOTAL SCORE: 4
HEADACHE, FULLNESS IN HEAD: NOT PRESENT
VISUAL DISTURBANCES: NOT PRESENT

## 2021-08-07 ASSESSMENT — PAIN DESCRIPTION - PAIN TYPE
TYPE: ACUTE PAIN

## 2021-08-07 ASSESSMENT — COGNITIVE AND FUNCTIONAL STATUS - GENERAL
MOVING FROM LYING ON BACK TO SITTING ON SIDE OF FLAT BED: A LOT
SUGGESTED CMS G CODE MODIFIER MOBILITY: CL
CLIMB 3 TO 5 STEPS WITH RAILING: A LOT
MOVING TO AND FROM BED TO CHAIR: A LOT
MOBILITY SCORE: 14
STANDING UP FROM CHAIR USING ARMS: A LOT
TURNING FROM BACK TO SIDE WHILE IN FLAT BAD: A LITTLE
WALKING IN HOSPITAL ROOM: A LITTLE

## 2021-08-07 ASSESSMENT — GAIT ASSESSMENTS
GAIT LEVEL OF ASSIST: MINIMAL ASSIST
DISTANCE (FEET): 80
ASSISTIVE DEVICE: FRONT WHEEL WALKER
DEVIATION: BRADYKINETIC;DECREASED TOE OFF

## 2021-08-07 NOTE — CARE PLAN
The patient is Stable - Low risk of patient condition declining or worsening    Shift Goals  Clinical Goals: Maintain adequate oxygenation and ventilation; treat per CIWAS  Patient Goals: rest; injury free    Progress made toward(s) clinical / shift goals:  Pt not experiencing visual hallucinations this shift, pt alert and oriented x4 with some confusion. Pt less agitated and agreeable to plan of care. Pt worked with physical therapy and got up to chair x1 assist. Patient still continuing to score on CIWA; will continue to monitor. Pt weaned down to 1 liter NC. Updated family on pt condition and plan of care.     Patient is not progressing towards the following goals: Pt still experiencing some confusion and weakness.

## 2021-08-07 NOTE — PROGRESS NOTES
Daily Progress Note:     Date of Service: 8/7/2021  Primary Team: [facility] Team   Attending: José Ramsay M.D.   Senior Resident: Dr. Mckeon  Intern: Dr. Vital  Contact:  [phone]    Chief Complaint:   Shortness of breath    Subjective:  Patient remarks feeling fine today.  Bedside evaluation, patient appears lethargic and somnolent.  Has difficulty staying awake.  Denies headache, fever, chills, nausea, vomiting, shortness of breath, chest pain, palpitations, abdominal pain, weakness, changes in stool, dysuria.  No acute events overnight.    Consultants/Specialty:  None    Review of Systems:   Review of Systems   Constitutional: Negative for chills, diaphoresis, fever, malaise/fatigue and weight loss.   HENT: Negative.    Eyes: Negative for blurred vision and double vision.   Respiratory: Negative for cough and shortness of breath.    Cardiovascular: Negative for chest pain, palpitations, orthopnea and leg swelling.   Gastrointestinal: Negative for abdominal pain, constipation, diarrhea, heartburn, nausea and vomiting.   Genitourinary: Negative for dysuria.   Musculoskeletal: Negative for myalgias.   Skin: Negative.    Neurological: Negative for dizziness, tingling, tremors, seizures, weakness and headaches.   Psychiatric/Behavioral: Negative for hallucinations. The patient is not nervous/anxious.        Objective Data:   Vitals:   Temp:  [36 °C (96.8 °F)-36.4 °C (97.5 °F)] 36.3 °C (97.3 °F)  Pulse:  [60-68] 60  Resp:  [16-17] 17  BP: (118-126)/(66-79) 125/76  SpO2:  [91 %-98 %] 97 %  Physical Exam:   Physical Exam  Constitutional:       General: He is not in acute distress.     Appearance: He is obese. He is not diaphoretic.      Interventions: Nasal cannula in place.   HENT:      Head: Normocephalic and atraumatic.      Nose: Nose normal.      Mouth/Throat:      Mouth: Mucous membranes are dry.      Pharynx: Oropharynx is clear. No oropharyngeal exudate.   Eyes:      Extraocular Movements: Extraocular  movements intact.      Conjunctiva/sclera: Conjunctivae normal.      Pupils: Pupils are equal, round, and reactive to light.   Neck:      Vascular: No carotid bruit.   Cardiovascular:      Rate and Rhythm: Normal rate and regular rhythm.      Pulses: Normal pulses.      Heart sounds: Normal heart sounds. No murmur heard.   No gallop.    Pulmonary:      Effort: Pulmonary effort is normal. No respiratory distress.      Breath sounds: Wheezing present.   Chest:      Chest wall: No tenderness.   Abdominal:      General: Bowel sounds are normal. There is distension.      Palpations: Abdomen is soft.      Tenderness: There is no abdominal tenderness. There is no guarding.   Musculoskeletal:         General: No tenderness. Normal range of motion.      Cervical back: Neck supple. No tenderness.      Right lower leg: Edema present.      Left lower leg: Edema present.   Skin:     General: Skin is warm and dry.   Neurological:      Mental Status: He is lethargic.   Psychiatric:         Attention and Perception: He is attentive. He does not perceive auditory hallucinations.         Speech: Speech is slurred.         Behavior: Behavior is slowed.         Labs:   See results tab    Imaging:   EC-ECHOCARDIOGRAM COMPLETE W/O CONT   Final Result      DX-CHEST-PORTABLE (1 VIEW)   Final Result      1.  There is no enlarged cardiac silhouette with probable changes of vascular congestion/edema.   2.  Right lower lobe opacity could be edema, atelectasis or pneumonitis.   3.  There is a small right pleural effusion.         71-year-old male with a past medical history of CAD (with pacemaker), hypertension, GERD, neuropathy is admitted due to shortness of breath, weakness, lethargy for the last 3 days.  Patient is from out of town and was here on a visit.  Remarks drinking 2-3 vodka tonics daily.  Remarks starting Lyrica about 4 days ago.  Has acute hypoxic respiratory failure secondary to pneumonia and/or congestive heart failure (EF 70%  with grade 3 diastolic dysfunction).  Treating with oxygen, bronchodilators, incentive spirometer, Augmentin.  Treating for symptoms of alcohol withdrawal; CIWA protocol in place. Discontinued lyrica.  Monitor electrolytes and replete as necessary.       * Acute hypoxemic respiratory failure (HCC)  Assessment & Plan  Suspect due to CHF and pneumonia  ELEAZAR? Patient remarked being diagnosed but not on CPAP; mallempai: 4; ABG: respi acidosis   *Considering CPAP at night  Due to purulent secretions, will d/c azithromycin and begin augmentin for atypical pneumonia  ECHO: Grade 3 diastolic dysfunction, EF 70%  Continue lasix; discontinue spironolactone (K: 4.7)  Continue oxygen (not reliant on O2 at home)  Start Duonebs  Query underlying copd (no wheezing currently) with extensive smoking h/o  following    Acute renal failure (ARF) ([facility])  Assessment & Plan  Decreased GFR; unknown baseline      Will bladder scan; F/U  FeNa: <1%; most likely prerenal  Condom kidd on  U/A: no LE, nitrites  Uprotein; pending  Ucreatinine; pending   *evaluating for glomerulonephritis  SPEP; pending; want to r/o MM    Pneumonia  Assessment & Plan  Suspected right sided  B/c and sputum cultures: NEG  On Augmentin PO  On aspiration protocol  On seizure protocol  Incentive spirometer  Mobilize patient as tolerated; PT/OT consulted      Alcohol withdrawal (HCC)  Assessment & Plan  H/o etoh abuse; last use Wednesday  On CIWA protocol with ativan; held today due to lethargy  Continue multivitamins, IVF  Replete electrolytes as needed; f/u CMP      Hallucinations  Assessment & Plan  Mild  Likely multifactorial  Started with new medication - lyrica - will stop as it has only been a few days  Suspect etoh w/d contributing  CIWA protocol with ativan  No focal deficits  Following  F/U ammonia     Elevated troponin  Assessment & Plan  Suspected acute heart failure  EKG:no acute ischemic changes noted  Denies chest pain, palpitations, headache,  dizziness  Trop only minimally elevated; will continue to trend  On telemetry  Start aspirin  ECHO:No prior study is available for comparison.   Left ventricular ejection fraction is visually estimated to be 70%.  Grade III diastolic dysfunction (restrictive pattern).  Mild to moderate mitral regurgitation.  Mild tricuspid regurgitation.      Estimated right ventricular systolic pressure is 55 mmHg.  T4: pending    Alkaline phosphatase elevation  Assessment & Plan  Mild; Follow-up CMP  GGT: 76; slightly elevated; due to liver/biliary component (most likely his alcohol use)    Nicotine dependence  Assessment & Plan  Cessation discussed and recommended  replacement

## 2021-08-07 NOTE — CARE PLAN
The patient is Watcher - Medium risk of patient condition declining or worsening    Shift Goals  Clinical Goals: Maintain adequate oxygenation and ventilation; treat per CIWAS  Patient Goals: rest; injury free    Progress made toward(s) clinical / shift goals:     Problem: Knowledge Deficit - Standard  Goal: Patient and family/care givers will demonstrate understanding of plan of care, disease process/condition, diagnostic tests and medications  Outcome: Progressing     Problem: Optimal Care for Alcohol Withdrawal  Goal: Optimal Care for the alcohol withdrawal patient  Outcome: Progressing   Pt scored a 10 and a 4 this shift per [facility] protocol   Problem: Lifestyle Changes  Goal: Patient's ability to identify lifestyle changes and available resources to help reduce recurrence of condition will improve  Outcome: Progressing     Problem: Psychosocial  Goal: Patient's level of anxiety will decrease  Outcome: Progressing  Goal: Spiritual and cultural needs incorporated into hospitalization  Outcome: Progressing       Patient is not progressing towards the following goals:

## 2021-08-07 NOTE — PROGRESS NOTES
Bedside report received and patient care assumed. Pt is resting in bed, A&O3, with no complaints of pain, and is on 2 L NC. Tele box on. All fall precautions are in place, belongings at bedside table.  Pt was updated on POC, no questions or concerns. Pt educated on use of call light for assistance. Will continue to monitor.

## 2021-08-07 NOTE — PROGRESS NOTES
Daily Progress Note:     Date of Service: 8/6/2021  Primary Team: [facility] Team   Attending: José Ramsay M.D.   Senior Resident: Dr. Mckeon  Intern: Dr. Vital  Contact:  [phone]    Chief Complaint:   Shortness of breath    Subjective:  Patient was very somnolent at bedside evaluation.  Per nurses report, patient was hallucinating and getting aggressive.  Ativan was administered.  Patient remarks feeling very tired.  Cannot get much information from him at this time.    Consultants/Specialty:  None  Review of Systems:   Review of Systems   Unable to perform ROS: Mental acuity       Objective Data:   Vitals:   Temp:  [35.8 °C (96.5 °F)-36.6 °C (97.8 °F)] 36.3 °C (97.3 °F)  Pulse:  [60] 60  Resp:  [18-20] 18  BP: (103-121)/(64-72) 121/70  SpO2:  [90 %-98 %] 97 %  Physical Exam:   Physical Exam  Constitutional:       General: He is not in acute distress.     Appearance: He is obese. He is not ill-appearing.      Interventions: Nasal cannula in place.      Comments: Doesn't use O2 at home   HENT:      Head: Normocephalic and atraumatic.      Nose: Nose normal.      Mouth/Throat:      Mouth: Mucous membranes are dry.      Pharynx: Oropharynx is clear.   Eyes:      Extraocular Movements: Extraocular movements intact.      Conjunctiva/sclera: Conjunctivae normal.      Pupils: Pupils are equal, round, and reactive to light.   Cardiovascular:      Rate and Rhythm: Normal rate and regular rhythm.      Pulses: Normal pulses.      Heart sounds: Normal heart sounds. No murmur heard.   No gallop.    Pulmonary:      Effort: Pulmonary effort is normal. No respiratory distress.      Breath sounds: Normal breath sounds. No rhonchi.   Chest:      Chest wall: No tenderness.   Abdominal:      General: Bowel sounds are normal. There is distension.      Palpations: Abdomen is soft.      Tenderness: There is no abdominal tenderness. There is no guarding.   Genitourinary:     Comments: Condom kidd in place  Musculoskeletal:          General: No swelling or tenderness. Normal range of motion.      Cervical back: Neck supple.   Skin:     General: Skin is warm and dry.   Neurological:      Comments: Could not assess at this time   Psychiatric:      Comments: Could not assess at this time         Labs:   See results tabs    Imaging:   EC-ECHOCARDIOGRAM COMPLETE W/O CONT   Final Result      DX-CHEST-PORTABLE (1 VIEW)   Final Result      1.  There is no enlarged cardiac silhouette with probable changes of vascular congestion/edema.   2.  Right lower lobe opacity could be edema, atelectasis or pneumonitis.   3.  There is a small right pleural effusion.         71-year-old male with a past medical history of CAD (with pacemaker), hypertension, GERD, neuropathy is admitted due to shortness of breath, weakness, lethargy for the last 3 days.  Patient is from out of town and was here on a visit.  Remarks drinking 2-3 vodka tonics daily.  Remarks starting Lyrica about 4 days ago.  Has acute hypoxic respiratory failure secondary to pneumonia and/or congestive heart failure (EF 70% with grade 3 diastolic dysfunction).  Treating with oxygen, bronchodilators, incentive spirometer, Augmentin.  Treating for symptoms of alcohol withdrawal; [facility] protocol in place.  Monitor electrolytes and replete as necessary.       * Acute hypoxemic respiratory failure (HCC)  Assessment & Plan  Suspect due to CHF and pneumonia  Due to purulent secretions, will d/c azithromycin and begin augmentin for atypical pneumonia  ECHO: Grade 3 diastolic dysfunction, EF 70%  Resume his lasix and aldactone  Continue oxygen (not reliant on O2 at home)  Start Duonebs  Query underlying copd (no wheezing currently) with extensive smoking h/o  following    Acute renal failure (ARF) (HCC)  Assessment & Plan  Decreased GFR; unknown baseline      Will bladder scan; F/U  FeNa  Condom kidd on  U/A: no LE, nitrites    Pneumonia  Assessment & Plan  Suspected right sided  Will check  procalcitonin  B/c and sputum cultures   Empiric azitromycin was started in ED; switched to augmentin based on purulent findings concerning for atypical pneumonia  On aspiration protocol  On seizure protocol  Incentive spirometer  Mobilize patient as tolerated      Alcohol withdrawal (HCC)  Assessment & Plan  H/o etoh abuse; last use Wednesday  On CIWA protocol with ativan  Continue multivitamins, IVF  Replete electrolytes as needed; f/u CMP      Hallucinations  Assessment & Plan  Mild  Likely multifactorial  Started with new medication - lyrica - will stop as it has only been a few days  Suspect etoh w/d contributing  CIWA protocol with ativan  No focal deficits  Following  F/U ammonia     Elevated troponin  Assessment & Plan  Suspected acute heart failure  EKG:no acute ischemic changes noted  Denies chest pain, palpitations, headache, dizziness  Trop only minimally elevated; will continue to trend  On telemetry  Start aspirin  ECHO:No prior study is available for comparison.   Left ventricular ejection fraction is visually estimated to be 70%.  Grade III diastolic dysfunction (restrictive pattern).  Mild to moderate mitral regurgitation.  Mild tricuspid regurgitation.      Estimated right ventricular systolic pressure is 55 mmHg.    Alkaline phosphatase elevation  Assessment & Plan  Mild; Follow-up CMP  Will assess GGT tomorrow    Nicotine dependence  Assessment & Plan  Cessation discussed and recommended  replacement

## 2021-08-07 NOTE — PROGRESS NOTES
Assumed care of PT A&O 4. Pt resting in bed with no signs of labored breathing. On 2 liters NC. Tele monitor in place, cardiac rhythm being monitored. Call light within reach, bed in lowest position, upper bed rails up. Pt was updated on plan of care for the day. Will continue to monitor.

## 2021-08-08 PROBLEM — I27.20 PULMONARY HYPERTENSION (HCC): Status: ACTIVE | Noted: 2021-08-08

## 2021-08-08 PROBLEM — I25.10 CAD (CORONARY ARTERY DISEASE): Status: ACTIVE | Noted: 2021-08-05

## 2021-08-08 LAB
ALBUMIN SERPL BCP-MCNC: 2.8 G/DL (ref 3.2–4.9)
ALBUMIN SERPL BCP-MCNC: 2.9 G/DL (ref 3.2–4.9)
ALBUMIN/GLOB SERPL: 0.8 G/DL
ALBUMIN/GLOB SERPL: 0.9 G/DL
ALP SERPL-CCNC: 123 U/L (ref 30–99)
ALP SERPL-CCNC: 129 U/L (ref 30–99)
ALT SERPL-CCNC: 15 U/L (ref 2–50)
ALT SERPL-CCNC: 17 U/L (ref 2–50)
ANION GAP SERPL CALC-SCNC: 8 MMOL/L (ref 7–16)
ANION GAP SERPL CALC-SCNC: 9 MMOL/L (ref 7–16)
AST SERPL-CCNC: 21 U/L (ref 12–45)
AST SERPL-CCNC: 23 U/L (ref 12–45)
BASOPHILS # BLD AUTO: 1.1 % (ref 0–1.8)
BASOPHILS # BLD: 0.09 K/UL (ref 0–0.12)
BILIRUB SERPL-MCNC: 0.3 MG/DL (ref 0.1–1.5)
BILIRUB SERPL-MCNC: 0.3 MG/DL (ref 0.1–1.5)
BUN SERPL-MCNC: 37 MG/DL (ref 8–22)
BUN SERPL-MCNC: 38 MG/DL (ref 8–22)
CALCIUM SERPL-MCNC: 9.7 MG/DL (ref 8.5–10.5)
CALCIUM SERPL-MCNC: 9.8 MG/DL (ref 8.5–10.5)
CHLORIDE SERPL-SCNC: 103 MMOL/L (ref 96–112)
CHLORIDE SERPL-SCNC: 104 MMOL/L (ref 96–112)
CO2 SERPL-SCNC: 28 MMOL/L (ref 20–33)
CO2 SERPL-SCNC: 32 MMOL/L (ref 20–33)
CREAT SERPL-MCNC: 1.46 MG/DL (ref 0.5–1.4)
CREAT SERPL-MCNC: 1.71 MG/DL (ref 0.5–1.4)
EKG IMPRESSION: NORMAL
EOSINOPHIL # BLD AUTO: 0.36 K/UL (ref 0–0.51)
EOSINOPHIL NFR BLD: 4.5 % (ref 0–6.9)
ERYTHROCYTE [DISTWIDTH] IN BLOOD BY AUTOMATED COUNT: 64.6 FL (ref 35.9–50)
GLOBULIN SER CALC-MCNC: 3.3 G/DL (ref 1.9–3.5)
GLOBULIN SER CALC-MCNC: 3.6 G/DL (ref 1.9–3.5)
GLUCOSE SERPL-MCNC: 113 MG/DL (ref 65–99)
GLUCOSE SERPL-MCNC: 115 MG/DL (ref 65–99)
HCT VFR BLD AUTO: 46.1 % (ref 42–52)
HGB BLD-MCNC: 14.4 G/DL (ref 14–18)
IMM GRANULOCYTES # BLD AUTO: 0.04 K/UL (ref 0–0.11)
IMM GRANULOCYTES NFR BLD AUTO: 0.5 % (ref 0–0.9)
LYMPHOCYTES # BLD AUTO: 1.03 K/UL (ref 1–4.8)
LYMPHOCYTES NFR BLD: 12.9 % (ref 22–41)
MAGNESIUM SERPL-MCNC: 1.9 MG/DL (ref 1.5–2.5)
MCH RBC QN AUTO: 32.1 PG (ref 27–33)
MCHC RBC AUTO-ENTMCNC: 31.2 G/DL (ref 33.7–35.3)
MCV RBC AUTO: 102.9 FL (ref 81.4–97.8)
MONOCYTES # BLD AUTO: 0.94 K/UL (ref 0–0.85)
MONOCYTES NFR BLD AUTO: 11.8 % (ref 0–13.4)
NEUTROPHILS # BLD AUTO: 5.5 K/UL (ref 1.82–7.42)
NEUTROPHILS NFR BLD: 69.2 % (ref 44–72)
NRBC # BLD AUTO: 0 K/UL
NRBC BLD-RTO: 0 /100 WBC
PHOSPHATE SERPL-MCNC: 3.5 MG/DL (ref 2.5–4.5)
PLATELET # BLD AUTO: 215 K/UL (ref 164–446)
PMV BLD AUTO: 11.6 FL (ref 9–12.9)
POTASSIUM SERPL-SCNC: 4.6 MMOL/L (ref 3.6–5.5)
POTASSIUM SERPL-SCNC: 4.9 MMOL/L (ref 3.6–5.5)
PROT SERPL-MCNC: 6.2 G/DL (ref 6–8.2)
PROT SERPL-MCNC: 6.4 G/DL (ref 6–8.2)
RBC # BLD AUTO: 4.48 M/UL (ref 4.7–6.1)
SODIUM SERPL-SCNC: 141 MMOL/L (ref 135–145)
SODIUM SERPL-SCNC: 143 MMOL/L (ref 135–145)
TROPONIN T SERPL-MCNC: 53 NG/L (ref 6–19)
WBC # BLD AUTO: 8 K/UL (ref 4.8–10.8)

## 2021-08-08 PROCEDURE — 84484 ASSAY OF TROPONIN QUANT: CPT

## 2021-08-08 PROCEDURE — A9270 NON-COVERED ITEM OR SERVICE: HCPCS | Performed by: STUDENT IN AN ORGANIZED HEALTH CARE EDUCATION/TRAINING PROGRAM

## 2021-08-08 PROCEDURE — 700102 HCHG RX REV CODE 250 W/ 637 OVERRIDE(OP): Performed by: STUDENT IN AN ORGANIZED HEALTH CARE EDUCATION/TRAINING PROGRAM

## 2021-08-08 PROCEDURE — 700102 HCHG RX REV CODE 250 W/ 637 OVERRIDE(OP): Performed by: HOSPITALIST

## 2021-08-08 PROCEDURE — 770020 HCHG ROOM/CARE - TELE (206)

## 2021-08-08 PROCEDURE — 83735 ASSAY OF MAGNESIUM: CPT

## 2021-08-08 PROCEDURE — 85025 COMPLETE CBC W/AUTO DIFF WBC: CPT

## 2021-08-08 PROCEDURE — 84100 ASSAY OF PHOSPHORUS: CPT

## 2021-08-08 PROCEDURE — 36415 COLL VENOUS BLD VENIPUNCTURE: CPT

## 2021-08-08 PROCEDURE — 93010 ELECTROCARDIOGRAM REPORT: CPT | Performed by: INTERNAL MEDICINE

## 2021-08-08 PROCEDURE — 94760 N-INVAS EAR/PLS OXIMETRY 1: CPT

## 2021-08-08 PROCEDURE — 80053 COMPREHEN METABOLIC PANEL: CPT

## 2021-08-08 PROCEDURE — A9270 NON-COVERED ITEM OR SERVICE: HCPCS | Performed by: HOSPITALIST

## 2021-08-08 PROCEDURE — 93005 ELECTROCARDIOGRAM TRACING: CPT | Performed by: STUDENT IN AN ORGANIZED HEALTH CARE EDUCATION/TRAINING PROGRAM

## 2021-08-08 PROCEDURE — 700111 HCHG RX REV CODE 636 W/ 250 OVERRIDE (IP): Performed by: STUDENT IN AN ORGANIZED HEALTH CARE EDUCATION/TRAINING PROGRAM

## 2021-08-08 PROCEDURE — 700111 HCHG RX REV CODE 636 W/ 250 OVERRIDE (IP): Performed by: HOSPITALIST

## 2021-08-08 PROCEDURE — 99233 SBSQ HOSP IP/OBS HIGH 50: CPT | Mod: GC | Performed by: INTERNAL MEDICINE

## 2021-08-08 RX ORDER — ATORVASTATIN CALCIUM 40 MG/1
40 TABLET, FILM COATED ORAL EVERY EVENING
Status: DISCONTINUED | OUTPATIENT
Start: 2021-08-08 | End: 2021-08-12 | Stop reason: HOSPADM

## 2021-08-08 RX ORDER — PREDNISONE 20 MG/1
40 TABLET ORAL DAILY
Status: DISCONTINUED | OUTPATIENT
Start: 2021-08-08 | End: 2021-08-09

## 2021-08-08 RX ADMIN — PSYLLIUM HUSK 1 PACKET: 3.4 POWDER ORAL at 04:47

## 2021-08-08 RX ADMIN — TRIAMCINOLONE ACETONIDE: 1 CREAM TOPICAL at 17:08

## 2021-08-08 RX ADMIN — TRIAMCINOLONE ACETONIDE: 1 CREAM TOPICAL at 04:44

## 2021-08-08 RX ADMIN — HEPARIN SODIUM 5000 UNITS: 5000 INJECTION, SOLUTION INTRAVENOUS; SUBCUTANEOUS at 21:39

## 2021-08-08 RX ADMIN — CLONIDINE HYDROCHLORIDE 0.1 MG: 0.1 TABLET ORAL at 04:46

## 2021-08-08 RX ADMIN — CLONIDINE HYDROCHLORIDE 0.1 MG: 0.1 TABLET ORAL at 18:00

## 2021-08-08 RX ADMIN — DOCUSATE SODIUM 50 MG AND SENNOSIDES 8.6 MG 2 TABLET: 8.6; 5 TABLET, FILM COATED ORAL at 17:08

## 2021-08-08 RX ADMIN — AMIODARONE HYDROCHLORIDE 200 MG: 200 TABLET ORAL at 17:06

## 2021-08-08 RX ADMIN — FOLIC ACID 1 MG: 1 TABLET ORAL at 04:50

## 2021-08-08 RX ADMIN — PREDNISONE 40 MG: 20 TABLET ORAL at 14:45

## 2021-08-08 RX ADMIN — HYDRALAZINE HYDROCHLORIDE 25 MG: 25 TABLET, FILM COATED ORAL at 04:46

## 2021-08-08 RX ADMIN — HYDRALAZINE HYDROCHLORIDE 25 MG: 25 TABLET, FILM COATED ORAL at 17:07

## 2021-08-08 RX ADMIN — FUROSEMIDE 20 MG: 20 TABLET ORAL at 04:46

## 2021-08-08 RX ADMIN — Medication 100 MG: at 04:46

## 2021-08-08 RX ADMIN — AMOXICILLIN AND CLAVULANATE POTASSIUM 1 TABLET: 875; 125 TABLET, FILM COATED ORAL at 04:46

## 2021-08-08 RX ADMIN — ASPIRIN 81 MG: 81 TABLET, COATED ORAL at 04:46

## 2021-08-08 RX ADMIN — FUROSEMIDE 20 MG: 20 TABLET ORAL at 18:00

## 2021-08-08 RX ADMIN — ATORVASTATIN CALCIUM 40 MG: 40 TABLET, FILM COATED ORAL at 17:07

## 2021-08-08 RX ADMIN — HEPARIN SODIUM 5000 UNITS: 5000 INJECTION, SOLUTION INTRAVENOUS; SUBCUTANEOUS at 04:45

## 2021-08-08 RX ADMIN — THERA TABS 1 TABLET: TAB at 04:45

## 2021-08-08 RX ADMIN — HEPARIN SODIUM 5000 UNITS: 5000 INJECTION, SOLUTION INTRAVENOUS; SUBCUTANEOUS at 14:26

## 2021-08-08 RX ADMIN — DOCUSATE SODIUM 50 MG AND SENNOSIDES 8.6 MG 2 TABLET: 8.6; 5 TABLET, FILM COATED ORAL at 04:45

## 2021-08-08 RX ADMIN — OMEPRAZOLE 20 MG: 20 CAPSULE, DELAYED RELEASE ORAL at 18:00

## 2021-08-08 RX ADMIN — AMOXICILLIN AND CLAVULANATE POTASSIUM 1 TABLET: 875; 125 TABLET, FILM COATED ORAL at 17:06

## 2021-08-08 RX ADMIN — CHOLECALCIFEROL (VITAMIN D3) 10 MCG (400 UNIT) TABLET 800 UNITS: at 18:00

## 2021-08-08 RX ADMIN — METOPROLOL SUCCINATE 100 MG: 50 TABLET, EXTENDED RELEASE ORAL at 17:07

## 2021-08-08 RX ADMIN — NICOTINE 14 MG: 14 PATCH TRANSDERMAL at 04:45

## 2021-08-08 ASSESSMENT — LIFESTYLE VARIABLES
HEADACHE, FULLNESS IN HEAD: NOT PRESENT
TREMOR: *
AUDITORY DISTURBANCES: NOT PRESENT
TOTAL SCORE: 4
VISUAL DISTURBANCES: NOT PRESENT
NAUSEA AND VOMITING: NO NAUSEA AND NO VOMITING
ORIENTATION AND CLOUDING OF SENSORIUM: ORIENTED AND CAN DO SERIAL ADDITIONS
AUDITORY DISTURBANCES: NOT PRESENT
VISUAL DISTURBANCES: NOT PRESENT
ORIENTATION AND CLOUDING OF SENSORIUM: ORIENTED AND CAN DO SERIAL ADDITIONS
TOTAL SCORE: 4
ORIENTATION AND CLOUDING OF SENSORIUM: ORIENTED AND CAN DO SERIAL ADDITIONS
NAUSEA AND VOMITING: NO NAUSEA AND NO VOMITING
AGITATION: NORMAL ACTIVITY
AUDITORY DISTURBANCES: NOT PRESENT
TOTAL SCORE: 4
AUDITORY DISTURBANCES: NOT PRESENT
ANXIETY: MILDLY ANXIOUS
PAROXYSMAL SWEATS: BARELY PERCEPTIBLE SWEATING. PALMS MOIST
AGITATION: SOMEWHAT MORE THAN NORMAL ACTIVITY
TOTAL SCORE: 3
AGITATION: NORMAL ACTIVITY
PAROXYSMAL SWEATS: BARELY PERCEPTIBLE SWEATING. PALMS MOIST
PAROXYSMAL SWEATS: BARELY PERCEPTIBLE SWEATING. PALMS MOIST
NAUSEA AND VOMITING: NO NAUSEA AND NO VOMITING
VISUAL DISTURBANCES: NOT PRESENT
NAUSEA AND VOMITING: NO NAUSEA AND NO VOMITING
PAROXYSMAL SWEATS: BARELY PERCEPTIBLE SWEATING. PALMS MOIST
HEADACHE, FULLNESS IN HEAD: NOT PRESENT
HEADACHE, FULLNESS IN HEAD: NOT PRESENT
ORIENTATION AND CLOUDING OF SENSORIUM: ORIENTED AND CAN DO SERIAL ADDITIONS
ANXIETY: MILDLY ANXIOUS
VISUAL DISTURBANCES: NOT PRESENT
HEADACHE, FULLNESS IN HEAD: NOT PRESENT
AGITATION: NORMAL ACTIVITY
ANXIETY: MILDLY ANXIOUS
ANXIETY: MILDLY ANXIOUS
TREMOR: *
PAROXYSMAL SWEATS: BARELY PERCEPTIBLE SWEATING. PALMS MOIST
AUDITORY DISTURBANCES: NOT PRESENT
HEADACHE, FULLNESS IN HEAD: NOT PRESENT
ANXIETY: MILDLY ANXIOUS
AGITATION: NORMAL ACTIVITY
TREMOR: *
ORIENTATION AND CLOUDING OF SENSORIUM: ORIENTED AND CAN DO SERIAL ADDITIONS
TOTAL SCORE: 4
VISUAL DISTURBANCES: NOT PRESENT
TREMOR: TREMOR NOT VISIBLE BUT CAN BE FELT, FINGERTIP TO FINGERTIP
NAUSEA AND VOMITING: NO NAUSEA AND NO VOMITING
TREMOR: TREMOR NOT VISIBLE BUT CAN BE FELT, FINGERTIP TO FINGERTIP

## 2021-08-08 ASSESSMENT — PAIN DESCRIPTION - PAIN TYPE
TYPE: ACUTE PAIN

## 2021-08-08 ASSESSMENT — ENCOUNTER SYMPTOMS
PALPITATIONS: 0
DIARRHEA: 0
CLAUDICATION: 0
COUGH: 0
CHILLS: 0
CONSTIPATION: 0
SHORTNESS OF BREATH: 1
BLURRED VISION: 0
DOUBLE VISION: 0
ABDOMINAL PAIN: 0
FEVER: 0
WEIGHT LOSS: 0
ORTHOPNEA: 0
TREMORS: 0
TINGLING: 0
HALLUCINATIONS: 0
HEADACHES: 0
VOMITING: 0
SEIZURES: 0
HEARTBURN: 0
DIZZINESS: 0
NAUSEA: 0
WEAKNESS: 0
DIAPHORESIS: 0
MYALGIAS: 0

## 2021-08-08 ASSESSMENT — FIBROSIS 4 INDEX: FIB4 SCORE: 1.89

## 2021-08-08 NOTE — ASSESSMENT & PLAN NOTE
Patient Education   finish antibiotics even if testing is negative.   Discuss with pediatrician vaccination status as our records show he is late for the last pertussis vaccination.   Return or go to the ER for any worsening symptoms or concerns.   If your provider has ordered additional testing as part of your ongoing plan of care, please allow 5-7 business days (from the day of your visit) for the testing to be resulted and reviewed by your provider. You will be contacted via telephone if your results are abnormal or changes need to be made with your current plan. If you have any questions regarding your testing, please contact the nurse at [phone]    You may be receiving a survey in the mail following your visit. Please take the time to complete this, as your feedback is very important to us! We strive to make your experience exceptional and your comments help us with that goal. We look forward to hearing from you!    Pertussis Testing  Also known as: Whooping cough tests  Formally known as: Bordetella pertussis Culture, Bordetella pertussis by PCR, Bordetella pertussis by DFA, Bordetella pertussis Antibodies, IgA, IgG, IgM  Related tests: RSV, Influenza tests  At a Glance  Why Get Tested?  To detect and diagnose a Bordetella pertussis infection.  When to Get Tested?  When you have persistent, sharp spasms or fits of coughing (paroxysms) that the doctor suspects is due to pertussis (whooping cough); when you have symptoms of a cold and have been exposed to someone with pertussis.  Sample Required?  A nasopharyngeal (NP) swab or a nasal aspirate; occasionally, a blood sample drawn from a vein in your arm.  Test Preparation Needed?  No.  The Test Sample  What is being tested?  Bordetella pertussis is a bacterium that targets the lungs, typically causing a three-stage respiratory infection that is known as pertussis or whooping cough. It is highly contagious and causes a prolonged infection that is passed  Type II (LVH, HPF) versus III (OHS, ELEAZAR)  On Lasix 40; hold for SBP less than 100  Controlling BP: Amiodarone 200 (still unsure as to why using this medication), clonidine 0.1 mg, hydralazine 25 mg, metoprolol 100 mg.  Monitor vitals   from person to person through respiratory droplets and close contact. Pertussis tests are performed to detect and diagnose a B. pertussis infection.  The incubation period for pertussis varies from a few days to up to three weeks. The first stage of the disease, called the catarrhal stage, usually lasts about two weeks and symptoms may resemble a mild cold. It is followed by the paroxysmal stage, which may last for one or two weeks or persist for a couple of months and is characterized by severe bouts of coughing. Eventually, the frequency of the coughing starts to decrease and the infected person enters the convalescent stage, with coughing decreasing over the next several weeks. Pertussis infection, however, can sometimes lead to complications such as pneumonia, encephalitis, and seizures, and it can be deadly. Infants tend to be the most severely affected and may require hospitalization.  Pertussis infections used to be very common in the [place], affecting about two hundred thousand people in epidemics that would occur every few years. Since the introduction of a pertussis vaccine and widespread vaccination of infants, this number has drastically decreased. However, since neither the vaccine nor the pertussis infection confers lifetime immunity, health professionals are still seeing periodic outbreaks of pertussis in young unvaccinated infants, in adolescents, and in adults. According to the Centers for Disease Control and Prevention (CDC), there were over 27,000 cases of whooping cough reported in 2010, and many more that went unreported. In 2011, the Advisory Committee on Immunization Practices updated their recommendations for pertussis booster shots.  · For children and teens ages 11 through 18 years old, a single booster shot is advised for those who have completed the recommended childhood diphtheria, tetanus and pertussis (DTP/DTaP) series of vaccinations.  · For adults aged 19 through 64 years, a  one-time vaccination is recommended.  · Adults 65 years and older who are or expect to be in close contact with infants, such as grandparents and child- and health-care providers, should receive a dose to boost their immunity if they had not previously done so.  Pertussis testing is used to diagnose these infections and to help minimize their spread to others. Several different types of tests are available to detect pertussis infection. Some of these include:  · Culture  · Detection of pertussis genetic material (Polymerase Chain Reaction, PCR)  · Direct fluorescent antibody (DFA)  · Blood tests for pertussis antibodies (serology), IgA, IgG, IgM  Pertussis can be challenging to diagnose at times because the symptoms that present during the catarrhal stage are frequently indistinguishable from those of a common cold or of another respiratory illness such as bronchitis, influenza (flu), and, in children, respiratory syncytial virus (RSV). In the paroxysmal stage, many adults and vaccinated people who have pertussis will present with only persistent coughing. Suspicion of pertussis infection is increased in people who have the classic “whoop,” in people who have cold symptoms and have been in close contact with someone who has been diagnosed with pertussis, and when there is a known pertussis outbreak in the community. A pertussis culture and/or PCR test will usually be ordered on these people but should not be performed on close contacts that do not have symptoms.  How is the sample collected for testing?  Sample collection technique is critical in pertussis testing. For a culture, or for a test for genetic material (PCR) or for DFA, a nasopharyngeal (NP) swab or nasal aspirate is used. The nasopharyngeal swab is collected by having you tip your head back and then a Dacron swab (like a long Q-tip with a small head) is gently inserted into one of your nostrils until resistance is met. It is left in place for several  seconds, then rotated several times to collect cells, and withdrawn. This is not painful, but it may tickle a bit, cause your eyes to tear, and provoke a coughing paroxysm. For a nasal aspirate, a syringe is used to push a small amount of sterile saline into your nose, and then gentle suction is applied to collect the resulting fluid. For antibody testing, a blood sample is obtained by inserting a needle into a vein in the arm.  The Test  How is it used?  Pertussis tests are used to detect and diagnose a Bordetella pertussis infection. Early diagnosis and treatment may lessen the severity of symptoms and help limit spread of the disease.  There are several tests that may be used when a pertussis infection is suspected:  · Culture - this test has been the \"gold standard\" for identifying pertussis and is used to diagnose a pertussis infection. The sample is put into nutrient media and the bacteria are grown and identified. Results are reported in one to two weeks.  · Polymerase Chain Reaction (PCR) - this test amplifies the genetic material of the bacteria in a sample and is available within a couple of days. PCR testing should not be used to diagnose outbreaks of the disease. False positive results may occur when PCR is used to screen people who may have been exposed but have no symptoms of disease.  · Direct Fluorescent Antibody (DFA) - this test is not as widely used as it once was. It is less specific and sensitive than the pertussis culture and PCR. However, this method could still be valuable to confirm the identity of bacteria grown in culture.  · Antibodies, IgA, IgG, IgM - these blood tests measure the body's immune response to a pertussis infection.  Other tests that may occasionally be ordered include:  · Toxin antibodies, IgA, IgG - these blood tests measure the body’s immune response to toxins released by B. pertussis.  · B. pertussis molecular sub-typing - this test may be ordered not to benefit an  individual, but so that health professionals can better understand the strain and severity of the B. pertussis present in a community during an outbreak.  Typically a pertussis culture and PCR test will both be ordered, as early in the illness as possible. Cultures are less likely to grow the organism 2-3 weeks into the illness and will be affected by some antimicrobial agents if the person has been treated.  Since the introduction of PCR testing, the use of direct fluorescent antibody testing has significantly decreased. When used, it should be ordered along with a pertussis culture to recover the organism in order to investigate potential outbreaks and perform antimicrobial susceptibility testing.  Pertussis antibody testing is not used frequently. They are ordered to help determine if a person has had a recent pertussis infection. Pertussis IgG antibodies will be present in anyone who has been vaccinated. Pertussis IgM and IgA antibodies will usually only be present a short time after vaccination or infection. These tests may sometimes be ordered to help evaluate and study the spread of pertussis in the community. Rarely, an antibody test may be performed to evaluate the adequacy of a person’s immune response to a pertussis vaccine.  When is it ordered?  Pertussis tests are ordered when a doctor suspects that a person has a Bordetella pertussis infection. A pertussis culture and PCR are performed when someone has symptoms suggestive of pertussis, and as early in the illness as possible (generally, within the first three weeks).  Symptoms during the first stage of the infection, called the catarrhal stage, may include typical cold symptoms such as a runny nose, sneezing, mild cough and/or a low-grade fever. After about two weeks, the paroxysmal stage begins and may include symptoms such as:  · Frequent severe bouts of coughing sometimes followed by vomiting  · Several rapid coughs followed by a whooping sound as  the person inhales; affected adults may cough but not whoop, and infants may have trouble breathing and may choke more than whoop  These symptoms may last for one or two weeks or persist for a couple of months. During the convalescent stage, the severity of symptoms lessens, with the frequency of coughing gradually decreasing over the next several weeks.  Antibody testing is generally more useful than culture or PCR for detecting an infection later in its course. A single blood sample can be collected 2 to 8 weeks after the start of symptoms, when the level of antibody in the blood is highest.  Acute and convalescent samples, collected several weeks apart, are sometimes ordered on a person who has not sought treatment until late in their illness or on an adult who has had a cough for an extended period of time.  What does the test result mean?  A positive culture is diagnostic for a B. pertussis infection, but a negative culture does not rule it out. Culture results are dependent on proper specimen collection and transport, duration of symptoms, when the sample is collected, and prior antimicrobial therapy administered before the culture is taken.  A positive PCR test means that it is likely that the person has pertussis. However, the PCR test may also be positive with other Bordetella species. A negative PCR test means that it is less likely that the person has pertussis but does not rule it out. If there are an insufficient number of organisms in the sample, then they may not be detected. Both culture and PCR tests are less likely to be positive as the illness progresses.  The direct fluorescent antibody test is not as sensitive or specific as other methods. If it is positive, then the person may have pertussis, but this should be confirmed with a culture. A negative direct fluorescent antibody test does not rule out pertussis.  The presence of IgG B. pertussis antibodies may be seen with a recent infection and  also after vaccination. A rise in the quantity of IgG B. pertussis antibodies between the acute and convalescent samples and the presence of IgM and IgA antibodies are evidence of a recent pertussis infection.  Is there anything else I should know?  The pertussis vaccination is given to infants as a series of shots. Those children who have not completed the series of pertussis vaccinations are at a higher risk of becoming infected. Even some people who have been vaccinated may be infected by B. pertussis, but they will tend to have a less severe illness.  Pertussis is treated with antibiotics, which will help to resolve the infection and help stop spread of the disease.  International travelers should be aware that many less developed countries do not have widespread vaccination for pertussis. Infants who have not completed their series of vaccinations and people who have not had a booster vaccination in many years may be at an increased risk of ana pertussis.  Common Questions  Can a throat culture be used instead of a nasopharyngeal sample from my nose?  A throat culture is not generally acceptable. During a pertussis infection, the organism is found in the tissues in the back of the nose, not in the throat or the front portion of the nose.  Can pertussis testing be done in my doctor’s office?  No. There is no simple, rapid diagnostic test for pertussis. It requires specialized equipment and is typically performed in laboratories. Not every laboratory performs this testing - samples may need to be sent to a public health laboratory.  Why did my doctor report my child’s pertussis infection?  Doctors are required to report pertussis to state health departments. Outbreaks are tracked and interventions, such as vaccination and appropriate antimicrobial therapy, are used to stop the outbreak.  Why do I hear so little about pertussis?  The number of people affected has dropped since widespread vaccination was  instituted in the [place]. Infants are routinely vaccinated, reducing the population who are susceptible. Pertussis outbreaks are sporadic instead of seasonal like influenza and RSV and may be underreported and under-diagnosed, especially in adults who may not seek treatment when they have cold symptoms or a persistent cough.  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 Is this the same as whooping cough?  B. parapertussis is a bacterium that can infect humans in the same manner as B. pertussis, but the infection usually causes a milder respiratory illness. Culture methods and PCR tests can detect and distinguish B. parapertussis from B. pertussis, and both  are commonly tested for since the clinical presentation may be similar in persons with either infection. There is no vaccine to prevent B. parapertussis infections.  Content last reviewed in January 2012.   This page was last modified in January 2012.    ©[id number] [employer]     Patient Education     Viral Upper Respiratory Illness (Child)  Your child has a viral upper respiratory illness (URI), which is another term for the common cold. The virus is contagious during the first few days. It is spread through the air by coughing, sneezing, or by direct contact (touching your sick child then touching your own eyes, nose, or mouth). Frequent handwashing will decrease risk of spread. Most viral illnesses resolve within 7 to 14 days with rest and simple home remedies. However, they may sometimes last up to 4 weeks. Antibiotics will not kill a virus and are generally not prescribed for this condition.    Home care  · Fluids: Fever increases water loss from the body. Encourage your child to drink lots of fluids to loosen lung secretions and make it easier to breathe. For infants under 1 year old, continue regular formula or breast feedings. Between feedings, give oral rehydration solution. This is available from [employer]  and grocery stores without a prescription. For children over 1 year old, give plenty of fluids, such as water, juice, gelatin water, soda without caffeine, ginger ale, lemonade, or ice pops.  · Eating: If your child doesn't want to eat solid foods, it's OK for a few days, as long as he or she drinks lots of fluid.  · Rest: Keep children with fever at home resting or playing quietly until the fever is gone. Encourage frequent naps. Your child may return to day care or school when the fever is gone and he or she is eating well and feeling better.  · Sleep: Periods of sleeplessness and irritability are common. A congested child will sleep best with the head and upper body propped up on pillows or with the head of the bed frame raised on a 6-inch block. An infant may sleep in a car seat placed in the crib or in a baby swing. If you use a car seat or baby swing, always make certain the baby is safely fastened in the device.  · Cough: Coughing is a normal part of this illness. A cool mist humidifier at the bedside may be helpful. Be sure to clean the humidifier every day to prevent mold. Over-the-counter cough and cold medicines have not proved to be any more helpful than a placebo (syrup with no medicine in it). In addition, these medicines can produce serious side effects, especially in infants under 2 years of age. Do not give over-the-counter cough and cold medicines to children under 6 years unless your healthcare provider has specifically advised you to do so. Also, don’t expose your child to cigarette smoke. It can make the cough worse.  · Nasal congestion: Suction the nose of infants with a bulb syringe. You may put 2 to 3 drops of saltwater (saline) nose drops in each nostril before suctioning. This helps thin and remove secretions. Saline nose drops are available without a prescription. You can also use ¼ teaspoon of table salt dissolved in 1 cup of water.  · Fever: Use children’s acetaminophen for fever,  fussiness, or discomfort, unless another medicine was prescribed. In infants over 6 months of age, you may use children’s ibuprofen or acetaminophen. (Note: If your child has chronic liver or kidney disease or has ever had a stomach ulcer or gastrointestinal bleeding, talk with your healthcare provider before using these medicines.) Aspirin should never be given to anyone younger than 18 years of age who is ill with a viral infection or fever. It may cause severe liver or brain damage.  · Preventing spread: Washing your hands before and after touching your sick child will help prevent a new infection. It will also help prevent the spread of this viral illness to yourself and other children.  Follow-up care  Follow up with your healthcare provider, or as advised.  When to seek medical advice  For a usually healthy child, call your child's healthcare provider right away if any of these occur:  · A fever, as follows:  ¨ Your child is 3 months old or younger and has a fever of 100.4°F (38°C) or higher. Get medical care right away. Fever in a young baby can be a sign of a dangerous infection.  ¨ Your child is of any age and has repeated fevers above 104°F (40°C).  ¨ Your child is younger than 2 years of age and a fever of 100.4°F (38°C) continues for more than 1 day.  ¨ Your child is 2 years old or older and a fever of 100.4°F (38°C) continues for more than 3 days.  · Earache, sinus pain, stiff or painful neck, headache, repeated diarrhea, or vomiting.  · Unusual fussiness.  · A new rash appears.  · Your child is dehydrated, with one or more of these symptoms:  ¨ No tears when crying.  ¨ “Sunken” eyes or a dry mouth.  ¨ No wet diapers for 8 hours in infants.  ¨ Reduced urine output in older children.  Call 911, or get immediate medical care  Contact emergency services if any of these occur:  · Increased wheezing or difficulty breathing  · Unusual drowsiness or confusion  · Fast breathing, as follows:  ¨ Birth to 6 weeks:  over 60 breaths per minute.  ¨ 6 weeks to 2 years: over 45 breaths per minute.  ¨ 3 to 6 years: over 35 breaths per minute.  ¨ 7 to 10 years: over 30 breaths per minute.  ¨ Older than 10 years: over 25 breaths per minute.  © 1500-2974 [employer]. [address] [place] [address], [place] [address]. All rights reserved. This information is not intended as a substitute for professional medical care. Always follow your healthcare professional's instructions.

## 2021-08-08 NOTE — NON-PROVIDER
Daily Progress Note:     Date of Service: 8/7/2021  Primary Team: [place]  Attending: José Ramsay M.D.   Senior Resident: Sultan Linda MD  Intern: Valerie Bronson  Medical Student     Chief Complaint: fatigue and SOB    ID:  Mr. Ra Ware is a 70 yo M presented to the ED with his wife while on vacation in [place]. Patient is visiting from [place], [place].     Subjective:   Patient is less lethargic in comparison to yesterday. He had good sleep overnight and coughs up less sputum. He denies chest pain, SOB, abdominal pain, and leg pain. He notes weakness and numbness of legs from before the hospitalization. He has not had a bowel movement since admission.      Consultants/Specialty: None.    Review of Systems:    Review of Systems   Constitutional: Positive for malaise/fatigue.   HENT: Negative for sore throat.    Eyes: Negative for blurred vision and double vision.   Respiratory: Positive for cough and sputum production. Negative for shortness of breath.    Cardiovascular: Positive for leg swelling. Negative for chest pain and palpitations.   Gastrointestinal: Negative for abdominal pain.   Neurological: Positive for weakness. Negative for headaches.   Psychiatric/Behavioral: Negative for hallucinations.       Objective:   Physical Exam:   Vitals:   Temp:  [36 °C (96.8 °F)-36.4 °C (97.5 °F)] 36.3 °C (97.3 °F)  Pulse:  [60-68] 60  Resp:  [16-17] 17  BP: (118-126)/(66-79) 125/76  SpO2:  [91 %-98 %] 97 %    Physical Exam  Constitutional:       Appearance: He is obese. He is ill-appearing.   HENT:      Head: Normocephalic and atraumatic.      Nose: No congestion or rhinorrhea.      Mouth/Throat:      Mouth: Mucous membranes are dry.      Pharynx: No posterior oropharyngeal erythema.   Neck:      Vascular: No carotid bruit.   Cardiovascular:      Rate and Rhythm: Normal rate and regular rhythm.      Pulses: Normal pulses.      Heart sounds: Normal heart sounds. No murmur heard.   No friction rub. No gallop.       " Comments: No JVD, no clubbing.  Pulmonary:      Effort: Pulmonary effort is normal.      Breath sounds: Normal breath sounds. No wheezing, rhonchi or rales.   Abdominal:      General: Bowel sounds are normal.      Palpations: Abdomen is soft.   Musculoskeletal:      Right lower leg: Edema present.      Left lower leg: Edema present.   Skin:     General: Skin is dry.      Findings: Bruising, erythema and rash present.   Neurological:      General: No focal deficit present.      Motor: Weakness present.         Assessment and Plan:  Hallucinations   Patient's wife stated onset of hallucinations of \"fish bowls\" began shortly after initiating Lyrica 50mg BID for neuropathy with heavy alcohol use (beyond his normal daily limit). Prolong EtOH use is also a high risk factor. Physical examination showed no apparent focal neurological deficits.   - Stop Lyrica  - CIWA per protocol. Ativan to prevent alcohol withdrawal seizures and progression into delirium tremens with hallucinosis. Use PRN. May hold if too sedative.  - frequent clinical reassessments including vital signs  -hold flexeril and trazadone     Acute hypoxemic respiratory failure   History of smoking (40 pack years), CAD, HTN, Afib (amiodarone and maze procedure). Possibly 2/2 to CHF, pneumonia, or COPD. Pulse ox 83% on presentation. Wheezing and increased respiratory effort. Currently stable at 91%. ABG showed respiratory acidosis.   -Continue oxygen. Goal 88-92   -Monitor respiratory status (rate, effort, wheezing, pulse ox sat)    -Repeat ABG to confirm accuracy of pulse ox and monitor hypercapnia  -nicotine replacement to prevent withdrawal during hospital   -Plan to discuss cigarette smoking cessation at discharge   -albuterol-ipratropium 2.5-0.5mg nebulizer every hour for 2-3 doses, then every 2-4 hours PRN  -prednisone 40mg once daily for 5 days. Monitor for worsening pneumonia.   -abx for PNA below  -continue lasix and aldactone     Right lower lobe " pneumonia  Patient with history of smoking and recent travel presenting with worsening fatigue, SOB, productive cough, and AMS since last Friday. Physical exam was significant for diffuse wheezing and chest x-ray showed right low lobe opacity. Elevated BUN 41 and Cr 1.84. Procalcitonin was 0.30 (likely bacterial CAP? Sensitivity 38-91%). Absence of fever may be contributed by age and mental status change may be main presentation (Brenda 2006, Yun 2017).  Negative for influenza and Covid. High risk for aspiration  - discontinued empiric azithromycin 500mg  - incentive spirometry  - monitor for risk of aspiration. Drink water from straw.   - negative blood culture  - add augmentin  - screen for HIV   - Monitor for improvement in cough, sputum production within the next 48-72 hours. Failure to improve may be progression of infection or other ddx (eg. COPD exacerbation, HF)  - Plan to recommend lung cancer screening on discharge due to hx of smoking     Acute renal failure   Peripheral edema. BUN 14. Cr 1.89. GFR 35. Unknown baseline. Ca, Mg, PO4 WNL. Medication review shows no apparent nephrotoxic drugs. Hypervolemic. Possible cardiorenal syndrome or pre-renal etiology.    -monitor electrolytes and fluid status  -continue lasix.  -bladder scan  -monitor Urine protein and FENA  -urine protein/Cr ratio (>1 indicated glomerular nephrosclerosis)      Elevated alkaline phosphatase  Presentation of confusion and long standing history of EtOH use. Alk phos was 155 at presentation. No AST/ALT elevations. Serum albumin decreased to 2.9.  Less likely alcohol-associated fatty liver or hepatic encephalopathy but still concerning.  - monitor AST/ALT and ammonia levels closely for acute changes (ratio AST:ALT >1-2)  - monitor GGT and bilirubin  - monitor CBC and coagulation studies for thrombocytopenia, anemia, elevated MCV, and elevated INR  -plan for Hep panel and iron studies if levels change     Alcohol withdrawal  Long term  heavy EtOH use. Current CIWA-Ar score of <10 (mild withdrawal)   - assessment of CIWA-Ar score every 4-6 hours.  - Give Ativan 2mg IV for scores at or above 8 (symptom trigger therapy)   - Plan to change to oxazepam if LFTs changes  - Continue multivitamin including B1 and B12 to prevent Wernicke's   - Closely monitor VS, pulse ox, fluids and electrolytes status, and neuro function for deterioration  -nutrition consult  -PT/OT     Elevated troponin  Suspect acute HF. No EKG changes on admission. Denies chest pain currently. Echo: EF 70%; moderate LVH; diastolic dysfunction (restrictive); mild dialted RV; decreaased RVSF; RV pressure 55mmHg; enlarged RA; dilated LA; MR/TR. Possible pulmonary HTN type 2 or 3. 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  -monitor EKG  -trend troponin   -SPEP look for  M spike   -pulm HTN management as above under RLL PNA  -ASA 81mg  -continue metoprolol, labetolol  -hold spironolactone, clonidine  -hydralazine PRN if BP tolerated  -plan rosuvastatin 20mg if LDL>70   -hemodynamic monitoring  -continue amiodarone for Afib? Risk vs benefit considerations. Free T4. May worsen pulm HTN and liver tox.Contact [place] doctor managing this to consider stopping. Manage outpatient.  -monitor BUN/Cr >20:1   -vasodilator therapy not indicated at this time  -LMW heparin prophylaxis     Venous stasis  Patient presented with mild brawny skin and peripheral edema.   -LE compression and elevation  -topical hydrocortisone and kenalog to reduce dermatitis    GI:   No bowel movement since admission.  -Mg 400 BID   DVT:  Antibiotics:

## 2021-08-08 NOTE — PROGRESS NOTES
Daily Progress Note:     Date of Service: 8/8/2021  Primary Team: [facility] Team   Attending: José Ramsay M.D.   Senior Resident: Dr. Mckeon  Intern: Dr. Vital  Contact:  [phone]    Chief Complaint:   Shortness of breath    Subjective:  Patient remarks feeling good today.  Per nursing notes, patient do not want to use CPAP at night stating it is very uncomfortable.  Denies any headache, fever, nausea, chills, wheezing, chest pain, palpitations, abdominal pain.  No other acute events overnight.    Consultants/Specialty:  None  Review of Systems:   Review of Systems   Constitutional: Negative for chills, diaphoresis, fever, malaise/fatigue and weight loss.   HENT: Negative.    Eyes: Negative for blurred vision and double vision.   Respiratory: Positive for shortness of breath. Negative for cough.    Cardiovascular: Positive for leg swelling. Negative for chest pain, palpitations, orthopnea and claudication.   Gastrointestinal: Negative for abdominal pain, constipation, diarrhea, heartburn, nausea and vomiting.   Genitourinary: Negative for dysuria.   Musculoskeletal: Negative for myalgias.   Skin: Negative.    Neurological: Negative for dizziness, tingling, tremors, seizures, weakness and headaches.   Psychiatric/Behavioral: Negative for hallucinations.       Objective Data:   Vitals:   Temp:  [36 °C (96.8 °F)-36.2 °C (97.2 °F)] 36 °C (96.8 °F)  Pulse:  [60-64] 60  Resp:  [16-20] 19  BP: (118-151)/(67-94) 118/67  SpO2:  [94 %-97 %] 97 %  Physical Exam:   Physical Exam  Constitutional:       General: He is not in acute distress.     Appearance: He is obese. He is not ill-appearing or diaphoretic.   HENT:      Head: Normocephalic and atraumatic.      Nose: Nose normal.      Mouth/Throat:      Mouth: Mucous membranes are dry.      Pharynx: Oropharynx is clear.   Eyes:      Extraocular Movements: Extraocular movements intact.      Conjunctiva/sclera: Conjunctivae normal.      Pupils: Pupils are equal, round,  and reactive to light.   Cardiovascular:      Rate and Rhythm: Normal rate and regular rhythm.      Pulses: Normal pulses.      Heart sounds: Normal heart sounds. No murmur heard.   No gallop.    Pulmonary:      Effort: Pulmonary effort is normal. No respiratory distress.      Breath sounds: Normal breath sounds. No rhonchi.   Chest:      Chest wall: No tenderness.   Abdominal:      General: Bowel sounds are normal. There is no distension.      Tenderness: There is no abdominal tenderness. There is no guarding.   Musculoskeletal:         General: Swelling present. Normal range of motion.      Cervical back: Neck supple. No tenderness.      Right lower leg: Edema present.      Left lower leg: Edema present.   Skin:     General: Skin is warm and dry.   Neurological:      General: No focal deficit present.      Mental Status: He is oriented to person, place, and time.   Psychiatric:         Behavior: Behavior normal.         Labs:   See results tab    Imaging:   EC-ECHOCARDIOGRAM COMPLETE W/O CONT   Final Result      DX-CHEST-PORTABLE (1 VIEW)   Final Result      1.  There is no enlarged cardiac silhouette with probable changes of vascular congestion/edema.   2.  Right lower lobe opacity could be edema, atelectasis or pneumonitis.   3.  There is a small right pleural effusion.         71-year-old male with a past medical history of CAD (with pacemaker), hypertension, GERD, neuropathy is admitted due to shortness of breath, weakness, lethargy for the last 3 days.  Patient is from out of town and was here on a visit.  Remarks drinking 2-3 vodka tonics daily.  Remarks starting Lyrica about 5 days ago.  Has acute hypoxic respiratory failure secondary to pneumonia and/or congestive heart failure (EF 70% with grade 3 diastolic dysfunction).  Treating with oxygen, bronchodilators, prednisone, incentive spirometer, Augmentin.  Treating for symptoms of alcohol withdrawal; [facility] protocol in place. Discontinued all sedatives.   Monitor electrolytes and replete as necessary.       * Acute hypoxemic respiratory failure (HCC)  Assessment & Plan  Suspect due to CHF and pneumonia  ELEAZAR? Patient remarked being diagnosed but not on CPAP; mallempai: 4; ABG: respi acidosis   *Did not tolerate CPAP last night  On Augmentin  ECHO: Grade 3 diastolic dysfunction, EF 70%  Continue lasix; discontinue spironolactone (K: 4.7)  Continue oxygen (not reliant on O2 at home)  Start Duonebs, prednisone 40 mg  Query underlying copd (no wheezing currently) with extensive smoking h/o  following    Pulmonary hypertension (HCC)  Assessment & Plan  Type II (LVH, HPF) versus III (OHS, ELEAZAR)  On Lasix 20; hold for SBP less than 115  Controlling BP: Amiodarone 200 (still unsure as to why using this medication), clonidine 0.1 mg, hydralazine 25 mg, metoprolol 100 mg.  Monitor vitals    Acute renal failure (ARF) (formerly [facility])  Assessment & Plan  Decreased GFR; unknown baseline      Will bladder scan; F/U  FeNa: <1%; most likely prerenal  Condom kidd on  U/A: no LE, nitrites  Uprotein; 45  Ucreatinine; 87.05   *evaluating for glomerulonephritis  SPEP; pending; want to r/o MM    Pneumonia  Assessment & Plan  Suspected right sided  B/c and sputum cultures: NEG  On Augmentin PO  On aspiration protocol  On seizure protocol  Incentive spirometer  Mobilize patient as tolerated; PT/OT consulted      Alcohol withdrawal (HCC)  Assessment & Plan  H/o etoh abuse; last use Wednesday  On CIWA protocol with ativan; held today due to lethargy  Continue multivitamins, IVF  Replete electrolytes as needed; f/u CMP      CAD (coronary artery disease)  Assessment & Plan  Suspected acute heart failure  EKG:no acute ischemic changes noted  Denies chest pain, palpitations, headache, dizziness  Trop only minimally elevated; will continue to trend  On telemetry  On aspirin 81 mg, metoprolol, atorvastatin  ECHO:No prior study is available for comparison.   Left ventricular ejection fraction is visually  estimated to be 70%.  Grade III diastolic dysfunction (restrictive pattern).  Mild to moderate mitral regurgitation.  Mild tricuspid regurgitation.      Estimated right ventricular systolic pressure is 55 mmHg.  T4: 1.31    Alkaline phosphatase elevation  Assessment & Plan  Mild; Follow-up CMP  GGT: 76; slightly elevated; due to liver/biliary component (most likely his alcohol use)    Nicotine dependence  Assessment & Plan  Cessation discussed and recommended  replacement    Hallucinations  Assessment & Plan  Mild  Likely multifactorial  Started with new medication - lyrica - will stop as it has only been a few days  Suspect etoh w/d contributing  CIWA protocol with ativan  No focal deficits  Following  F/U ammonia

## 2021-08-08 NOTE — CARE PLAN
The patient is Stable - Low risk of patient condition declining or worsening    Shift Goals  Clinical Goals: Maintain adequate oxygenation and ventilation; treat per CIWAS  Patient Goals: rest; injury free    Progress made toward(s) clinical / shift goals:  Pt alert and oriented x4, pt lethargic despite not receiving ativan; MD notified. ABG's ordered; see results in chart. Pt up to chair and walked 80 ft with physical therapy.Pt family updated on plan of care.     Patient is not progressing towards the following goals:Pt lethargic. ICU notified.

## 2021-08-08 NOTE — THERAPY
Physical Therapy   Initial Evaluation     Patient Name: Ra Ware  Age:  71 y.o., Sex:  male  Medical Record #: [id number]  Today's Date: 8/7/2021     Precautions: Fall Risk    Assessment  Patient is 71 y.o. male with presenting w/ ARF w/ hypoxia, CHFpEF, and possible pneumonia.  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  The pt reports he was ambulating at home using a SPC, and using a FWW for the community.  The pt was initially difficult to arouse to begin eval, but became much more alert after about 10min.  He demos bed mobility Minerva w/ HOB elevated, and STS Minerva EOB w/ FWW.  The pt was able to ambulate about 80' using FWW CGA w/ no LOB observed, however significantly limited by fatigue.  VSS throughout activity, however pt reporting of SOB and c/o instability in B knee joints.  Provided the pt w/ exercises to perform in supine to maintain knee stability and ankle mobility.  Recommend placement given pt's current limitations in functional mobility tasks.  Will follow to address deficits in bed mobility, transfers, and activity tolerance.      Plan    Recommend Physical Therapy 4 times per week until therapy goals are met for the following treatments:  Bed Mobility, Community Re-integration, Equipment, Gait Training, Manual Therapy, Neuro Re-Education / Balance, Orthotics Training, Self Care/Home Evaluation, Stair Training, Therapeutic Activities and Therapeutic Exercises    DC Equipment Recommendations: None (pt owns FWW)  Discharge Recommendations: Recommend post-acute placement for additional physical therapy services prior to discharge home       Subjective/Objective     08/07/21 1599   Prior Living Situation   Prior Services Home-Independent   Housing / Facility 1 Story House   Steps Into Home 1   Equipment Owned Front-Wheel Walker;Single Point Cane   Lives with - Patient's Self Care Capacity Spouse    Comments Pt lives w/ his wife who is able to assist as needed at home   Prior Level of Functional Mobility   Bed Mobility Independent   Transfer Status Independent   Ambulation Independent   Distance Ambulation (Feet)   (community distances)   Assistive Devices Used Single Point Cane  (SPC at home, FWW at work)   Stairs Independent   Comments pt uses FWW at work, 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    History of Falls   History of Falls No   Cognition    Cognition / Consciousness X   Speech/ Communication Delayed Responses   Level of Consciousness Alert   Comments Pt pleasant and cooperative, however difficult to arouse initially, and delayed responsed   Active ROM Lower Body    Active ROM Lower Body  WDL   Comments observed during functional mobility   Strength Lower Body   Lower Body Strength  WDL   Comments as above   Sensation Lower Body   Lower Extremity Sensation   X   Comments sensation diminished to light touch RLE plantar surface of foot   Coordination Lower Body    Coordination Lower Body  WDL   Balance Assessment   Sitting Balance (Static) Fair +   Sitting Balance (Dynamic) Fair +   Standing Balance (Static) Fair   Standing Balance (Dynamic) Fair -   Weight Shift Sitting Fair   Weight Shift Standing Fair   Comments stand w/ FWW   Gait Analysis   Gait Level Of Assist Minimal Assist  (CGA)   Assistive Device Front Wheel Walker   Distance (Feet) 80   # of Times Distance was Traveled 1   Deviation Bradykinetic;Decreased Toe Off   Weight Bearing Status FWB BLE   Bed Mobility    Supine to Sit Supervised   Sit to Supine Supervised   Scooting Supervised   Rolling Supervised   Comments HOB elevated   Functional Mobility   Sit to Stand Minimal Assist   Bed, Chair, Wheelchair Transfer Minimal Assist   Transfer Method Stand Step   Mobility STS EOB w/ FWW   Activity Tolerance   Sitting Edge of Bed 15min   Standing 15min   Edema / Skin Assessment   Edema / Skin  WDL   Short Term Goals    Short Term Goal # 1 The pt will  demo supine<>sit EOB w/ HOB flat Gerard in 6 visits for independence w/ bed mobility.   Short Term Goal # 2 The pt will demo gait distance >150' using FWW spv in a moving environment in 6 visits for household ambulation.   Short Term Goal # 3 The pt will demo ability to ascend/descend 1 step w/ UE support on railing spv in 6 visits for access to his home.   Short Term Goal # 4 The pt will demo stand-step transfer EOB<>chair Gerard w/ FWW in 6 visits for functional mobility   Education Group   Education Provided Role of Physical Therapist;Gait Training;Exercises - Supine   Role of Physical Therapist Patient Response Patient;Acceptance;Explanation;Demonstration;Verbal Demonstration;Action Demonstration   Gait Training Patient Response Patient;Acceptance;Explanation;Demonstration;Verbal Demonstration;Action Demonstration   Exercises - Supine Patient Response Patient;Acceptance;Explanation;Demonstration;Verbal Demonstration;Action Demonstration  (ankle pumps and quad sets)   Additional Comments pt receptive of edu provided   Problem List    Problems Pain;Impaired Bed Mobility;Impaired Transfers;Impaired Ambulation;Functional Strength Deficit;Impaired Balance;Decreased Activity Tolerance

## 2021-08-08 NOTE — PROGRESS NOTES
"RT Juan J at bedside, CPAP in place.     Patient refusing CPAP at this time, c/o \"the flow is too high\". Pt educated as to the need for CPAP. Will continue to monitor   "

## 2021-08-08 NOTE — CARE PLAN
The patient is Watcher - Medium risk of patient condition declining or worsening    Shift Goals  Clinical Goals: Maintain adequate oxygenation and ventilation; treat per CIWAS  Patient Goals: rest; injury free    Progress made toward(s) clinical / shift goals:      Problem: Optimal Care for Alcohol Withdrawal  Goal: Optimal Care for the alcohol withdrawal patient  Outcome: Progressing     Problem: Lifestyle Changes  Goal: Patient's ability to identify lifestyle changes and available resources to help reduce recurrence of condition will improve  Outcome: Progressing     Problem: Psychosocial  Goal: Patient's level of anxiety will decrease  Outcome: Progressing       Patient is not progressing towards the following goals:      Problem: Knowledge Deficit - Standard  Goal: Patient and family/care givers will demonstrate understanding of plan of care, disease process/condition, diagnostic tests and medications  Outcome: Not Met   Pt refusing CPAP this evening.

## 2021-08-08 NOTE — PROGRESS NOTES
Notified RT Juan J regarding patient's order for CPAP/BiPAP at night. Per RT, machine will be sent and set up shortly. At this time, patient verbalizes understanding of need for use and is willing to try out tonight.

## 2021-08-08 NOTE — PROGRESS NOTES
Assumed care of PT A&O 4. Pt resting in bed with no signs of labored breathing. On 1.5 liters NC. Tele monitor in place, cardiac rhythm being monitored. Call light within reach, bed in lowest position, upper bed rails up. Pt was updated on plan of care for the day. Will continue to monitor.

## 2021-08-09 PROBLEM — I50.33 ACUTE ON CHRONIC HEART FAILURE WITH PRESERVED EJECTION FRACTION (HCC): Status: ACTIVE | Noted: 2021-08-09

## 2021-08-09 LAB
25(OH)D3 SERPL-MCNC: 71 NG/ML (ref 30–100)
BASOPHILS # BLD AUTO: 0.4 % (ref 0–1.8)
BASOPHILS # BLD: 0.02 K/UL (ref 0–0.12)
EOSINOPHIL # BLD AUTO: 0 K/UL (ref 0–0.51)
EOSINOPHIL NFR BLD: 0 % (ref 0–6.9)
ERYTHROCYTE [DISTWIDTH] IN BLOOD BY AUTOMATED COUNT: 63.2 FL (ref 35.9–50)
HCT VFR BLD AUTO: 45.9 % (ref 42–52)
HGB BLD-MCNC: 14.2 G/DL (ref 14–18)
IMM GRANULOCYTES # BLD AUTO: 0.01 K/UL (ref 0–0.11)
IMM GRANULOCYTES NFR BLD AUTO: 0.2 % (ref 0–0.9)
LYMPHOCYTES # BLD AUTO: 0.59 K/UL (ref 1–4.8)
LYMPHOCYTES NFR BLD: 11 % (ref 22–41)
MAGNESIUM SERPL-MCNC: 1.8 MG/DL (ref 1.5–2.5)
MCH RBC QN AUTO: 31.4 PG (ref 27–33)
MCHC RBC AUTO-ENTMCNC: 30.9 G/DL (ref 33.7–35.3)
MCV RBC AUTO: 101.5 FL (ref 81.4–97.8)
MONOCYTES # BLD AUTO: 0.2 K/UL (ref 0–0.85)
MONOCYTES NFR BLD AUTO: 3.7 % (ref 0–13.4)
NEUTROPHILS # BLD AUTO: 4.54 K/UL (ref 1.82–7.42)
NEUTROPHILS NFR BLD: 84.7 % (ref 44–72)
NRBC # BLD AUTO: 0 K/UL
NRBC BLD-RTO: 0 /100 WBC
PHOSPHATE SERPL-MCNC: 3.3 MG/DL (ref 2.5–4.5)
PLATELET # BLD AUTO: 223 K/UL (ref 164–446)
PMV BLD AUTO: 11 FL (ref 9–12.9)
RBC # BLD AUTO: 4.52 M/UL (ref 4.7–6.1)
WBC # BLD AUTO: 5.4 K/UL (ref 4.8–10.8)

## 2021-08-09 PROCEDURE — A9270 NON-COVERED ITEM OR SERVICE: HCPCS | Performed by: HOSPITALIST

## 2021-08-09 PROCEDURE — 700102 HCHG RX REV CODE 250 W/ 637 OVERRIDE(OP): Performed by: STUDENT IN AN ORGANIZED HEALTH CARE EDUCATION/TRAINING PROGRAM

## 2021-08-09 PROCEDURE — 36415 COLL VENOUS BLD VENIPUNCTURE: CPT

## 2021-08-09 PROCEDURE — 700111 HCHG RX REV CODE 636 W/ 250 OVERRIDE (IP): Performed by: HOSPITALIST

## 2021-08-09 PROCEDURE — 700102 HCHG RX REV CODE 250 W/ 637 OVERRIDE(OP): Performed by: HOSPITALIST

## 2021-08-09 PROCEDURE — 82306 VITAMIN D 25 HYDROXY: CPT

## 2021-08-09 PROCEDURE — 84100 ASSAY OF PHOSPHORUS: CPT

## 2021-08-09 PROCEDURE — 700111 HCHG RX REV CODE 636 W/ 250 OVERRIDE (IP): Performed by: STUDENT IN AN ORGANIZED HEALTH CARE EDUCATION/TRAINING PROGRAM

## 2021-08-09 PROCEDURE — A9270 NON-COVERED ITEM OR SERVICE: HCPCS | Performed by: STUDENT IN AN ORGANIZED HEALTH CARE EDUCATION/TRAINING PROGRAM

## 2021-08-09 PROCEDURE — 99233 SBSQ HOSP IP/OBS HIGH 50: CPT | Mod: GC | Performed by: INTERNAL MEDICINE

## 2021-08-09 PROCEDURE — 83735 ASSAY OF MAGNESIUM: CPT

## 2021-08-09 PROCEDURE — 700101 HCHG RX REV CODE 250: Performed by: STUDENT IN AN ORGANIZED HEALTH CARE EDUCATION/TRAINING PROGRAM

## 2021-08-09 PROCEDURE — 770020 HCHG ROOM/CARE - TELE (206)

## 2021-08-09 PROCEDURE — 85025 COMPLETE CBC W/AUTO DIFF WBC: CPT

## 2021-08-09 PROCEDURE — 94640 AIRWAY INHALATION TREATMENT: CPT

## 2021-08-09 RX ORDER — LORAZEPAM 2 MG/ML
0.5 INJECTION INTRAMUSCULAR EVERY 4 HOURS PRN
Status: DISCONTINUED | OUTPATIENT
Start: 2021-08-09 | End: 2021-08-09

## 2021-08-09 RX ORDER — LORAZEPAM 2 MG/ML
1 INJECTION INTRAMUSCULAR
Status: DISCONTINUED | OUTPATIENT
Start: 2021-08-09 | End: 2021-08-09

## 2021-08-09 RX ORDER — LORAZEPAM 2 MG/ML
2 INJECTION INTRAMUSCULAR
Status: DISCONTINUED | OUTPATIENT
Start: 2021-08-09 | End: 2021-08-09

## 2021-08-09 RX ORDER — LORAZEPAM 2 MG/1
2 TABLET ORAL
Status: DISCONTINUED | OUTPATIENT
Start: 2021-08-09 | End: 2021-08-09

## 2021-08-09 RX ORDER — QUETIAPINE FUMARATE 25 MG/1
50 TABLET, FILM COATED ORAL NIGHTLY
Status: DISCONTINUED | OUTPATIENT
Start: 2021-08-09 | End: 2021-08-10

## 2021-08-09 RX ORDER — LORAZEPAM 0.5 MG/1
0.5 TABLET ORAL EVERY 4 HOURS PRN
Status: DISCONTINUED | OUTPATIENT
Start: 2021-08-09 | End: 2021-08-09

## 2021-08-09 RX ORDER — LORAZEPAM 1 MG/1
1 TABLET ORAL EVERY 4 HOURS PRN
Status: DISCONTINUED | OUTPATIENT
Start: 2021-08-09 | End: 2021-08-09

## 2021-08-09 RX ORDER — LORAZEPAM 2 MG/ML
1.5 INJECTION INTRAMUSCULAR
Status: DISCONTINUED | OUTPATIENT
Start: 2021-08-09 | End: 2021-08-09

## 2021-08-09 RX ORDER — LORAZEPAM 0.5 MG/1
0.5 TABLET ORAL EVERY 6 HOURS PRN
Status: DISCONTINUED | OUTPATIENT
Start: 2021-08-09 | End: 2021-08-10

## 2021-08-09 RX ORDER — MAGNESIUM SULFATE HEPTAHYDRATE 40 MG/ML
2 INJECTION, SOLUTION INTRAVENOUS ONCE
Status: COMPLETED | OUTPATIENT
Start: 2021-08-09 | End: 2021-08-09

## 2021-08-09 RX ORDER — IPRATROPIUM BROMIDE AND ALBUTEROL SULFATE 2.5; .5 MG/3ML; MG/3ML
3 SOLUTION RESPIRATORY (INHALATION)
Status: DISCONTINUED | OUTPATIENT
Start: 2021-08-09 | End: 2021-08-10

## 2021-08-09 RX ORDER — FUROSEMIDE 40 MG/1
40 TABLET ORAL ONCE
Status: COMPLETED | OUTPATIENT
Start: 2021-08-09 | End: 2021-08-09

## 2021-08-09 RX ADMIN — FUROSEMIDE 20 MG: 20 TABLET ORAL at 04:53

## 2021-08-09 RX ADMIN — ASPIRIN 81 MG: 81 TABLET, COATED ORAL at 04:51

## 2021-08-09 RX ADMIN — METOPROLOL SUCCINATE 100 MG: 50 TABLET, EXTENDED RELEASE ORAL at 18:41

## 2021-08-09 RX ADMIN — HYDRALAZINE HYDROCHLORIDE 25 MG: 25 TABLET, FILM COATED ORAL at 04:51

## 2021-08-09 RX ADMIN — FUROSEMIDE 40 MG: 40 TABLET ORAL at 12:00

## 2021-08-09 RX ADMIN — PSYLLIUM HUSK 1 PACKET: 3.4 POWDER ORAL at 04:53

## 2021-08-09 RX ADMIN — LORAZEPAM 0.5 MG: 2 INJECTION INTRAMUSCULAR; INTRAVENOUS at 08:04

## 2021-08-09 RX ADMIN — AMOXICILLIN AND CLAVULANATE POTASSIUM 1 TABLET: 875; 125 TABLET, FILM COATED ORAL at 04:52

## 2021-08-09 RX ADMIN — AMIODARONE HYDROCHLORIDE 200 MG: 200 TABLET ORAL at 18:41

## 2021-08-09 RX ADMIN — QUETIAPINE FUMARATE 50 MG: 25 TABLET ORAL at 20:28

## 2021-08-09 RX ADMIN — HEPARIN SODIUM 5000 UNITS: 5000 INJECTION, SOLUTION INTRAVENOUS; SUBCUTANEOUS at 04:53

## 2021-08-09 RX ADMIN — HYDRALAZINE HYDROCHLORIDE 25 MG: 25 TABLET, FILM COATED ORAL at 18:41

## 2021-08-09 RX ADMIN — DOCUSATE SODIUM 50 MG AND SENNOSIDES 8.6 MG 2 TABLET: 8.6; 5 TABLET, FILM COATED ORAL at 04:51

## 2021-08-09 RX ADMIN — CLONIDINE HYDROCHLORIDE 0.1 MG: 0.1 TABLET ORAL at 18:41

## 2021-08-09 RX ADMIN — NICOTINE 14 MG: 14 PATCH TRANSDERMAL at 04:52

## 2021-08-09 RX ADMIN — LORAZEPAM 0.5 MG: 0.5 TABLET ORAL at 04:50

## 2021-08-09 RX ADMIN — MAGNESIUM SULFATE 2 G: 2 INJECTION INTRAVENOUS at 08:04

## 2021-08-09 RX ADMIN — LORAZEPAM 0.5 MG: 0.5 TABLET ORAL at 20:28

## 2021-08-09 RX ADMIN — PREDNISONE 40 MG: 20 TABLET ORAL at 04:52

## 2021-08-09 RX ADMIN — IPRATROPIUM BROMIDE AND ALBUTEROL SULFATE 3 ML: .5; 2.5 SOLUTION RESPIRATORY (INHALATION) at 14:56

## 2021-08-09 RX ADMIN — CHOLECALCIFEROL (VITAMIN D3) 10 MCG (400 UNIT) TABLET 800 UNITS: at 18:40

## 2021-08-09 RX ADMIN — TRIAMCINOLONE ACETONIDE: 1 CREAM TOPICAL at 18:40

## 2021-08-09 RX ADMIN — CLONIDINE HYDROCHLORIDE 0.1 MG: 0.1 TABLET ORAL at 04:52

## 2021-08-09 RX ADMIN — ATORVASTATIN CALCIUM 40 MG: 40 TABLET, FILM COATED ORAL at 18:41

## 2021-08-09 RX ADMIN — HEPARIN SODIUM 5000 UNITS: 5000 INJECTION, SOLUTION INTRAVENOUS; SUBCUTANEOUS at 20:28

## 2021-08-09 RX ADMIN — TRIAMCINOLONE ACETONIDE: 1 CREAM TOPICAL at 04:58

## 2021-08-09 RX ADMIN — OMEPRAZOLE 20 MG: 20 CAPSULE, DELAYED RELEASE ORAL at 18:40

## 2021-08-09 ASSESSMENT — LIFESTYLE VARIABLES
AGITATION: NORMAL ACTIVITY
TOTAL SCORE: 3
AGITATION: SOMEWHAT MORE THAN NORMAL ACTIVITY
PAROXYSMAL SWEATS: *
HEADACHE, FULLNESS IN HEAD: NOT PRESENT
AUDITORY DISTURBANCES: NOT PRESENT
ORIENTATION AND CLOUDING OF SENSORIUM: ORIENTED AND CAN DO SERIAL ADDITIONS
HEADACHE, FULLNESS IN HEAD: NOT PRESENT
HEADACHE, FULLNESS IN HEAD: NOT PRESENT
PAROXYSMAL SWEATS: BARELY PERCEPTIBLE SWEATING. PALMS MOIST
NAUSEA AND VOMITING: NO NAUSEA AND NO VOMITING
VISUAL DISTURBANCES: NOT PRESENT
TOTAL SCORE: 10
AUDITORY DISTURBANCES: NOT PRESENT
VISUAL DISTURBANCES: NOT PRESENT
HEADACHE, FULLNESS IN HEAD: NOT PRESENT
AUDITORY DISTURBANCES: NOT PRESENT
ANXIETY: *
PAROXYSMAL SWEATS: BARELY PERCEPTIBLE SWEATING. PALMS MOIST
TOTAL SCORE: 5
TOTAL SCORE: 3
VISUAL DISTURBANCES: NOT PRESENT
AGITATION: *
VISUAL DISTURBANCES: NOT PRESENT
NAUSEA AND VOMITING: NO NAUSEA AND NO VOMITING
ORIENTATION AND CLOUDING OF SENSORIUM: ORIENTED AND CAN DO SERIAL ADDITIONS
VISUAL DISTURBANCES: NOT PRESENT
HEADACHE, FULLNESS IN HEAD: NOT PRESENT
ORIENTATION AND CLOUDING OF SENSORIUM: ORIENTED AND CAN DO SERIAL ADDITIONS
AGITATION: NORMAL ACTIVITY
TOTAL SCORE: 3
AGITATION: *
TREMOR: TREMOR NOT VISIBLE BUT CAN BE FELT, FINGERTIP TO FINGERTIP
TREMOR: *
AUDITORY DISTURBANCES: NOT PRESENT
TREMOR: TREMOR NOT VISIBLE BUT CAN BE FELT, FINGERTIP TO FINGERTIP
ANXIETY: MILDLY ANXIOUS
NAUSEA AND VOMITING: NO NAUSEA AND NO VOMITING
AGITATION: SOMEWHAT MORE THAN NORMAL ACTIVITY
TREMOR: TREMOR NOT VISIBLE BUT CAN BE FELT, FINGERTIP TO FINGERTIP
AUDITORY DISTURBANCES: NOT PRESENT
AUDITORY DISTURBANCES: NOT PRESENT
TREMOR: TREMOR NOT VISIBLE BUT CAN BE FELT, FINGERTIP TO FINGERTIP
ORIENTATION AND CLOUDING OF SENSORIUM: ORIENTED AND CAN DO SERIAL ADDITIONS
ANXIETY: MILDLY ANXIOUS
TREMOR: *
NAUSEA AND VOMITING: NO NAUSEA AND NO VOMITING
HEADACHE, FULLNESS IN HEAD: NOT PRESENT
VISUAL DISTURBANCES: NOT PRESENT
TOTAL SCORE: 7
PAROXYSMAL SWEATS: BARELY PERCEPTIBLE SWEATING. PALMS MOIST
PAROXYSMAL SWEATS: NO SWEAT VISIBLE
ANXIETY: MILDLY ANXIOUS
ORIENTATION AND CLOUDING OF SENSORIUM: ORIENTED AND CAN DO SERIAL ADDITIONS
ANXIETY: MILDLY ANXIOUS
PAROXYSMAL SWEATS: BARELY PERCEPTIBLE SWEATING. PALMS MOIST
ANXIETY: *
NAUSEA AND VOMITING: NO NAUSEA AND NO VOMITING
ORIENTATION AND CLOUDING OF SENSORIUM: ORIENTED AND CAN DO SERIAL ADDITIONS
NAUSEA AND VOMITING: NO NAUSEA AND NO VOMITING

## 2021-08-09 ASSESSMENT — ENCOUNTER SYMPTOMS
TINGLING: 0
DIARRHEA: 0
TREMORS: 0
COUGH: 0
CHILLS: 0
ABDOMINAL PAIN: 0
PSYCHIATRIC NEGATIVE: 1
VOMITING: 0
SHORTNESS OF BREATH: 1
WEAKNESS: 0
DIZZINESS: 0
CONSTIPATION: 0
MYALGIAS: 0
DIAPHORESIS: 0
BLURRED VISION: 0
HEADACHES: 0
NAUSEA: 0
FEVER: 0
WEIGHT LOSS: 0
PALPITATIONS: 0
HEARTBURN: 0
DOUBLE VISION: 0

## 2021-08-09 ASSESSMENT — PAIN DESCRIPTION - PAIN TYPE: TYPE: ACUTE PAIN

## 2021-08-09 NOTE — PROGRESS NOTES
RT Juan J at bedside to assist patient with CPAP use.     Pt refusing CPAP at this time. Patient reports he slept well last night and does not want the CPAP. Will continue to monitor patient's oxygen saturation throughout the night.

## 2021-08-09 NOTE — CARE PLAN
Problem: Knowledge Deficit - Standard  Goal: Patient and family/care givers will demonstrate understanding of plan of care, disease process/condition, diagnostic tests and medications  Outcome: Progressing     Problem: Optimal Care for Alcohol Withdrawal  Goal: Optimal Care for the alcohol withdrawal patient  Outcome: Progressing     Problem: Lifestyle Changes  Goal: Patient's ability to identify lifestyle changes and available resources to help reduce recurrence of condition will improve  Outcome: Progressing     Problem: Psychosocial  Goal: Patient's level of anxiety will decrease  Outcome: Progressing  Goal: Spiritual and cultural needs incorporated into hospitalization  Outcome: Progressing     Problem: Pain - Standard  Goal: Alleviation of pain or a reduction in pain to the patient’s comfort goal  Outcome: Progressing     Problem: Skin Integrity  Goal: Skin integrity is maintained or improved  Outcome: Progressing   The patient is Watcher - Medium risk of patient condition declining or worsening    Shift Goals  Clinical Goals: Maintain adequate oxygenation and ventilation; treat per CIWAS  Patient Goals: rest; injury free

## 2021-08-09 NOTE — CARE PLAN
The patient is Watcher - Medium risk of patient condition declining or worsening    Shift Goals  Clinical Goals: Maintain adequate oxygenation and ventilation; treat per CIWAS  Patient Goals: rest; injury free    Progress made toward(s) clinical / shift goals:       Problem: Knowledge Deficit - Standard  Goal: Patient and family/care givers will demonstrate understanding of plan of care, disease process/condition, diagnostic tests and medications  Outcome: Progressing     Problem: Optimal Care for Alcohol Withdrawal  Goal: Optimal Care for the alcohol withdrawal patient  Outcome: Progressing   Pt has scored a 4 per CIWA protocol this shift.   Problem: Lifestyle Changes  Goal: Patient's ability to identify lifestyle changes and available resources to help reduce recurrence of condition will improve  Outcome: Progressing     Problem: Psychosocial  Goal: Patient's level of anxiety will decrease  Outcome: Progressing   Pt less anxious, lethargic and agitated this shift.     Problem: Skin Integrity  Goal: Skin integrity is maintained or improved  Outcome: Progressing       Patient is not progressing towards the following goals:    Pt oxygen saturation decrease while sleeping. Pt refusing CPAP again tonight. Pt currently on 2 L NC while sleeping to maintain adequate oxygen saturation levels.

## 2021-08-09 NOTE — THERAPY
Physical Therapy Contact Note    Discussed with RN, patient actively withdrawing and agitated. Will hold PT treatment today and re attempt as appropriate and able.    Patricia Zuniga, PT, DPT  [phone]

## 2021-08-09 NOTE — PROGRESS NOTES
Bedside report received from night RN; assumed pt care. Pt assessment complete. Pt A&Ox4, pt is forgetful. Reviewed plan of care with pt. Tele box on and rhythm verified. Chart and labs reviewed. Bed in lowest position, and 2 side rails up. Pt educated on call light; call light with in reach.

## 2021-08-09 NOTE — PROGRESS NOTES
Daily Progress Note:     Date of Service: 8/9/2021  Primary Team: [facility] Team   Attending: Ashok Chauhan M.D.   Senior Resident: Dr. Forte  Intern: Dr. Vital  Contact:  [phone]    Chief Complaint:   Shortness of breath    Subjective:  Remarks feeling okay today.  At bedside evaluation, patient still looked lethargic and sleepy; off any sedatives.  Remarked not able to sleep well due to the comings and tara of hospital staff in his room.  Remarks some difficulty ambulating.  Per nurse report, patient ripped off all his lines (monitor, oxygen, IV) and remarks not remembering the event.  Denies chest pain, palpitations, nausea, vomiting, headache, wheezing, abdominal pain, weakness.    Consultants/Specialty:  None  Review of Systems:   Review of Systems   Constitutional: Negative for chills, diaphoresis, fever, malaise/fatigue and weight loss.   HENT: Negative.    Eyes: Negative for blurred vision and double vision.   Respiratory: Positive for shortness of breath. Negative for cough.    Cardiovascular: Negative for chest pain, palpitations and leg swelling.   Gastrointestinal: Negative for abdominal pain, constipation, diarrhea, heartburn, nausea and vomiting.   Genitourinary: Negative.    Musculoskeletal: Negative for myalgias.   Skin: Negative.    Neurological: Negative for dizziness, tingling, tremors, weakness and headaches.   Psychiatric/Behavioral: Negative.        Objective Data:   Vitals:   Temp:  [36 °C (96.8 °F)-36.6 °C (97.8 °F)] 36.2 °C (97.2 °F)  Pulse:  [60-68] 60  Resp:  [18] 18  BP: (123-138)/(75-86) 123/75  SpO2:  [93 %-98 %] 98 %  Physical Exam:   Physical Exam  Constitutional:       General: He is not in acute distress.     Appearance: He is obese. He is not ill-appearing or diaphoretic.      Interventions: Nasal cannula in place.   HENT:      Head: Normocephalic and atraumatic.      Nose: Nose normal.      Mouth/Throat:      Mouth: Mucous membranes are dry.      Pharynx:  Oropharynx is clear.   Eyes:      Extraocular Movements: Extraocular movements intact.      Conjunctiva/sclera: Conjunctivae normal.      Pupils: Pupils are equal, round, and reactive to light.   Cardiovascular:      Rate and Rhythm: Normal rate and regular rhythm.      Pulses: Normal pulses.      Heart sounds: Normal heart sounds. No murmur heard.   No gallop.    Pulmonary:      Effort: No respiratory distress.      Breath sounds: Wheezing (mild expiratory; throughout) present.   Chest:      Chest wall: No tenderness.   Abdominal:      General: Bowel sounds are normal. There is distension.      Tenderness: There is no abdominal tenderness. There is no guarding.   Musculoskeletal:         General: No tenderness. Normal range of motion.      Cervical back: Neck supple.      Right lower leg: Edema present.      Left lower leg: Edema present.   Skin:     General: Skin is warm and dry.   Neurological:      General: No focal deficit present.      Mental Status: He is oriented to person, place, and time. He is lethargic.      Motor: Weakness present.   Psychiatric:         Mood and Affect: Mood normal.         Behavior: Behavior normal.         Labs:   See results tab    Imaging:   EC-ECHOCARDIOGRAM COMPLETE W/O CONT   Final Result      DX-CHEST-PORTABLE (1 VIEW)   Final Result      1.  There is no enlarged cardiac silhouette with probable changes of vascular congestion/edema.   2.  Right lower lobe opacity could be edema, atelectasis or pneumonitis.   3.  There is a small right pleural effusion.         71-year-old male with a past medical history of CAD (with pacemaker), hypertension, GERD, neuropathy is admitted due to shortness of breath, weakness, lethargy.  Patient is from out of town and was here on a visit. Remarks drinking 2-3 vodka tonics daily.  Remarks starting Lyrica about 6 days ago.  Has acute hypoxic respiratory failure secondary to pneumonia and/or congestive heart failure (EF 70% with grade 3 diastolic  dysfunction). Treating with oxygen, bronchodilators, incentive spirometer. Treating for symptoms of alcohol withdrawal; CIWA protocol in place. Discontinued all sedatives.  Monitor electrolytes and replete as necessary. PT/OT suggested SNF; talked to wife and agreed; f/u      * Acute hypoxemic respiratory failure (HCC)  Assessment & Plan  Suspect due to CHF and pneumonia  ELEAZAR? Patient remarked being diagnosed but not on CPAP; mallempai: 4; ABG: respi acidosis   *Did not tolerate CPAP last night or the night before  Completed Augmentin  ECHO: Grade 3 diastolic dysfunction, EF 70%  Continue lasix; discontinue spironolactone (K: 4.9)  Continue oxygen (not reliant on O2 at home)  Continue Duonebs, d/c prednisone 40 mg  Query underlying copd (mild expiratory wheezes) with extensive smoking h/o      Pulmonary hypertension (HCC)  Assessment & Plan  Type II (LVH, HPF) versus III (OHS, ELEAZAR)  On Lasix 20; hold for SBP less than 115  Controlling BP: Amiodarone 200 (still unsure as to why using this medication), clonidine 0.1 mg, hydralazine 25 mg, metoprolol 100 mg.  Monitor vitals    Acute renal failure (ARF) (HCC)  Assessment & Plan  Decreased GFR; unknown baseline    FeNa: <1%; most likely prerenal  Condom kdid on  U/A: no LE, nitrites  Uprotein; 45  Ucreatinine; 87.05   *evaluating for glomerulonephritis  SPEP; pending; want to r/o MM    Alcohol withdrawal (HCC)  Assessment & Plan  H/o etoh abuse; last use Wednesday  On CIWA protocol with ativan; held today due to lethargy  Continue multivitamins, IVF  Replete electrolytes as needed; f/u CMP      CAD (coronary artery disease)  Assessment & Plan  Suspected acute heart failure  EKG:no acute ischemic changes noted  Denies chest pain, palpitations, headache, dizziness  Trop only minimally elevated; will continue to trend  On telemetry  On aspirin 81 mg, metoprolol, atorvastatin  ECHO:No prior study is available for comparison.   Left ventricular ejection fraction is visually  estimated to be 70%.  Grade III diastolic dysfunction (restrictive pattern).  Mild to moderate mitral regurgitation.  Mild tricuspid regurgitation.      Estimated right ventricular systolic pressure is 55 mmHg.  T4: 1.31    Alkaline phosphatase elevation  Assessment & Plan  Mild; Follow-up CMP  GGT: 76; slightly elevated; due to liver/biliary component (most likely his alcohol use)    Nicotine dependence  Assessment & Plan  Cessation discussed and recommended  replacement    Pneumonia  Assessment & Plan  Suspected right sided  B/c and sputum cultures: NEG  Completed Augmentin PO  On aspiration protocol  On seizure protocol  Incentive spirometer  Mobilize patient as tolerated; PT/OT consulted: they recommend SNF due to weakness. I talked to his wife via phone and she agrees that it's best for him because she cannot care for him. She prefers that SNF be here in [place] because she feels they'll take better care of him here; she's willing to drive and see him when needed until he gets better. Would like updates from SW/ about options of facilities and next steps      Hallucinations  Assessment & Plan  Mild; Likely multifactorial  Started with new medication - lyrica - stopped  Suspect etoh w/d contributing  CIWA protocol; CIWA today: 10  No focal deficits

## 2021-08-09 NOTE — PROGRESS NOTES
Told by charge RN the pt ripped everything off, monitor, pulse ox, and ripped his IV out. [facility] notifed

## 2021-08-09 NOTE — CARE PLAN
The patient is Stable - Low risk of patient condition declining or worsening    Shift Goals  Clinical Goals: Maintain adequate oxygenation and ventilation; treat per CIWAS  Patient Goals: rest; injury free    Progress made toward(s) clinical / shift goals:  Pt and family updated on plan of care, pt more alert today and oriented x4. Pt up to chair x1 assist 4 times this shift.     Patient is not progressing towards the following goals: N/A

## 2021-08-09 NOTE — PROGRESS NOTES
Bedside report received and patient care assumed. Pt is resting in bed, A&O4, with no complaints of pain, and is on 1 L NC. Tele box on. All fall precautions are in place, belongings at bedside table.  Pt was updated on POC, no questions or concerns. Pt e  ducated on use of call light for assistance. Will continue to monitor. +

## 2021-08-10 ENCOUNTER — PATIENT OUTREACH (OUTPATIENT)
Dept: HEALTH INFORMATION MANAGEMENT | Facility: OTHER | Age: 71
End: 2021-08-10

## 2021-08-10 ENCOUNTER — HOSPITAL ENCOUNTER (INPATIENT)
Facility: REHABILITATION | Age: 71
End: 2021-08-10
Attending: PHYSICAL MEDICINE & REHABILITATION | Admitting: PHYSICAL MEDICINE & REHABILITATION
Payer: MEDICARE

## 2021-08-10 PROBLEM — R44.3 HALLUCINATIONS: Status: RESOLVED | Noted: 2021-08-05 | Resolved: 2021-08-10

## 2021-08-10 LAB
ALBUMIN SERPL BCP-MCNC: 3 G/DL (ref 3.2–4.9)
ALBUMIN/GLOB SERPL: 0.9 G/DL
ALP SERPL-CCNC: 118 U/L (ref 30–99)
ALT SERPL-CCNC: 15 U/L (ref 2–50)
ANION GAP SERPL CALC-SCNC: 10 MMOL/L (ref 7–16)
AST SERPL-CCNC: 15 U/L (ref 12–45)
BACTERIA BLD CULT: NORMAL
BACTERIA BLD CULT: NORMAL
BASOPHILS # BLD AUTO: 0.3 % (ref 0–1.8)
BASOPHILS # BLD: 0.03 K/UL (ref 0–0.12)
BILIRUB SERPL-MCNC: 0.3 MG/DL (ref 0.1–1.5)
BUN SERPL-MCNC: 39 MG/DL (ref 8–22)
CALCIUM SERPL-MCNC: 10 MG/DL (ref 8.5–10.5)
CHLORIDE SERPL-SCNC: 105 MMOL/L (ref 96–112)
CO2 SERPL-SCNC: 29 MMOL/L (ref 20–33)
CREAT SERPL-MCNC: 1.61 MG/DL (ref 0.5–1.4)
EOSINOPHIL # BLD AUTO: 0.11 K/UL (ref 0–0.51)
EOSINOPHIL NFR BLD: 1.3 % (ref 0–6.9)
ERYTHROCYTE [DISTWIDTH] IN BLOOD BY AUTOMATED COUNT: 62.6 FL (ref 35.9–50)
GLOBULIN SER CALC-MCNC: 3.2 G/DL (ref 1.9–3.5)
GLUCOSE SERPL-MCNC: 125 MG/DL (ref 65–99)
HCT VFR BLD AUTO: 45.1 % (ref 42–52)
HGB BLD-MCNC: 14 G/DL (ref 14–18)
IMM GRANULOCYTES # BLD AUTO: 0.05 K/UL (ref 0–0.11)
IMM GRANULOCYTES NFR BLD AUTO: 0.6 % (ref 0–0.9)
LYMPHOCYTES # BLD AUTO: 1.03 K/UL (ref 1–4.8)
LYMPHOCYTES NFR BLD: 12 % (ref 22–41)
MAGNESIUM SERPL-MCNC: 2.1 MG/DL (ref 1.5–2.5)
MCH RBC QN AUTO: 31.6 PG (ref 27–33)
MCHC RBC AUTO-ENTMCNC: 31 G/DL (ref 33.7–35.3)
MCV RBC AUTO: 101.8 FL (ref 81.4–97.8)
MONOCYTES # BLD AUTO: 0.83 K/UL (ref 0–0.85)
MONOCYTES NFR BLD AUTO: 9.7 % (ref 0–13.4)
NEUTROPHILS # BLD AUTO: 6.55 K/UL (ref 1.82–7.42)
NEUTROPHILS NFR BLD: 76.1 % (ref 44–72)
NRBC # BLD AUTO: 0 K/UL
NRBC BLD-RTO: 0 /100 WBC
PHOSPHATE SERPL-MCNC: 3.3 MG/DL (ref 2.5–4.5)
PLATELET # BLD AUTO: 232 K/UL (ref 164–446)
PMV BLD AUTO: 11.3 FL (ref 9–12.9)
POTASSIUM SERPL-SCNC: 4.6 MMOL/L (ref 3.6–5.5)
PROT SERPL-MCNC: 6.2 G/DL (ref 6–8.2)
RBC # BLD AUTO: 4.43 M/UL (ref 4.7–6.1)
SIGNIFICANT IND 70042: NORMAL
SIGNIFICANT IND 70042: NORMAL
SITE SITE: NORMAL
SITE SITE: NORMAL
SODIUM SERPL-SCNC: 144 MMOL/L (ref 135–145)
SOURCE SOURCE: NORMAL
SOURCE SOURCE: NORMAL
WBC # BLD AUTO: 8.6 K/UL (ref 4.8–10.8)

## 2021-08-10 PROCEDURE — A9270 NON-COVERED ITEM OR SERVICE: HCPCS | Performed by: HOSPITALIST

## 2021-08-10 PROCEDURE — 700102 HCHG RX REV CODE 250 W/ 637 OVERRIDE(OP): Performed by: STUDENT IN AN ORGANIZED HEALTH CARE EDUCATION/TRAINING PROGRAM

## 2021-08-10 PROCEDURE — 700101 HCHG RX REV CODE 250: Performed by: STUDENT IN AN ORGANIZED HEALTH CARE EDUCATION/TRAINING PROGRAM

## 2021-08-10 PROCEDURE — 85025 COMPLETE CBC W/AUTO DIFF WBC: CPT

## 2021-08-10 PROCEDURE — 99232 SBSQ HOSP IP/OBS MODERATE 35: CPT | Mod: GC | Performed by: INTERNAL MEDICINE

## 2021-08-10 PROCEDURE — 97535 SELF CARE MNGMENT TRAINING: CPT

## 2021-08-10 PROCEDURE — 97535 SELF CARE MNGMENT TRAINING: CPT | Mod: CO

## 2021-08-10 PROCEDURE — 770001 HCHG ROOM/CARE - MED/SURG/GYN PRIV*

## 2021-08-10 PROCEDURE — 84100 ASSAY OF PHOSPHORUS: CPT

## 2021-08-10 PROCEDURE — 99223 1ST HOSP IP/OBS HIGH 75: CPT | Performed by: PHYSICAL MEDICINE & REHABILITATION

## 2021-08-10 PROCEDURE — 700102 HCHG RX REV CODE 250 W/ 637 OVERRIDE(OP): Performed by: HOSPITALIST

## 2021-08-10 PROCEDURE — A9270 NON-COVERED ITEM OR SERVICE: HCPCS | Performed by: STUDENT IN AN ORGANIZED HEALTH CARE EDUCATION/TRAINING PROGRAM

## 2021-08-10 PROCEDURE — 83735 ASSAY OF MAGNESIUM: CPT

## 2021-08-10 PROCEDURE — 94660 CPAP INITIATION&MGMT: CPT

## 2021-08-10 PROCEDURE — 700111 HCHG RX REV CODE 636 W/ 250 OVERRIDE (IP): Performed by: HOSPITALIST

## 2021-08-10 PROCEDURE — 97530 THERAPEUTIC ACTIVITIES: CPT

## 2021-08-10 PROCEDURE — 94640 AIRWAY INHALATION TREATMENT: CPT

## 2021-08-10 PROCEDURE — 80053 COMPREHEN METABOLIC PANEL: CPT

## 2021-08-10 PROCEDURE — 36415 COLL VENOUS BLD VENIPUNCTURE: CPT

## 2021-08-10 RX ORDER — LORAZEPAM 0.5 MG/1
0.5 TABLET ORAL
Status: DISCONTINUED | OUTPATIENT
Start: 2021-08-10 | End: 2021-08-12 | Stop reason: HOSPADM

## 2021-08-10 RX ORDER — IPRATROPIUM BROMIDE AND ALBUTEROL SULFATE 2.5; .5 MG/3ML; MG/3ML
3 SOLUTION RESPIRATORY (INHALATION)
Status: DISCONTINUED | OUTPATIENT
Start: 2021-08-10 | End: 2021-08-12 | Stop reason: HOSPADM

## 2021-08-10 RX ORDER — FUROSEMIDE 40 MG/1
40 TABLET ORAL
Status: DISCONTINUED | OUTPATIENT
Start: 2021-08-10 | End: 2021-08-12 | Stop reason: HOSPADM

## 2021-08-10 RX ORDER — QUETIAPINE FUMARATE 25 MG/1
100 TABLET, FILM COATED ORAL NIGHTLY
Status: DISCONTINUED | OUTPATIENT
Start: 2021-08-10 | End: 2021-08-12 | Stop reason: HOSPADM

## 2021-08-10 RX ADMIN — AMIODARONE HYDROCHLORIDE 200 MG: 200 TABLET ORAL at 17:34

## 2021-08-10 RX ADMIN — HEPARIN SODIUM 5000 UNITS: 5000 INJECTION, SOLUTION INTRAVENOUS; SUBCUTANEOUS at 20:24

## 2021-08-10 RX ADMIN — OMEPRAZOLE 20 MG: 20 CAPSULE, DELAYED RELEASE ORAL at 17:34

## 2021-08-10 RX ADMIN — HEPARIN SODIUM 5000 UNITS: 5000 INJECTION, SOLUTION INTRAVENOUS; SUBCUTANEOUS at 06:08

## 2021-08-10 RX ADMIN — IPRATROPIUM BROMIDE AND ALBUTEROL SULFATE 3 ML: .5; 2.5 SOLUTION RESPIRATORY (INHALATION) at 07:20

## 2021-08-10 RX ADMIN — HYDRALAZINE HYDROCHLORIDE 25 MG: 25 TABLET, FILM COATED ORAL at 06:09

## 2021-08-10 RX ADMIN — TRIAMCINOLONE ACETONIDE: 1 CREAM TOPICAL at 17:34

## 2021-08-10 RX ADMIN — ASPIRIN 81 MG: 81 TABLET, COATED ORAL at 06:09

## 2021-08-10 RX ADMIN — PSYLLIUM HUSK 1 PACKET: 3.4 POWDER ORAL at 06:06

## 2021-08-10 RX ADMIN — CLONIDINE HYDROCHLORIDE 0.1 MG: 0.1 TABLET ORAL at 06:08

## 2021-08-10 RX ADMIN — TRIAMCINOLONE ACETONIDE: 1 CREAM TOPICAL at 06:08

## 2021-08-10 RX ADMIN — HEPARIN SODIUM 5000 UNITS: 5000 INJECTION, SOLUTION INTRAVENOUS; SUBCUTANEOUS at 14:40

## 2021-08-10 RX ADMIN — NICOTINE 14 MG: 14 PATCH TRANSDERMAL at 06:06

## 2021-08-10 RX ADMIN — FUROSEMIDE 40 MG: 40 TABLET ORAL at 11:10

## 2021-08-10 RX ADMIN — DOCUSATE SODIUM 50 MG AND SENNOSIDES 8.6 MG 2 TABLET: 8.6; 5 TABLET, FILM COATED ORAL at 17:34

## 2021-08-10 RX ADMIN — QUETIAPINE FUMARATE 100 MG: 25 TABLET ORAL at 20:23

## 2021-08-10 RX ADMIN — HYDRALAZINE HYDROCHLORIDE 25 MG: 25 TABLET, FILM COATED ORAL at 17:33

## 2021-08-10 RX ADMIN — METOPROLOL SUCCINATE 100 MG: 50 TABLET, EXTENDED RELEASE ORAL at 17:34

## 2021-08-10 RX ADMIN — ATORVASTATIN CALCIUM 40 MG: 40 TABLET, FILM COATED ORAL at 17:33

## 2021-08-10 RX ADMIN — CHOLECALCIFEROL (VITAMIN D3) 10 MCG (400 UNIT) TABLET 800 UNITS: at 17:33

## 2021-08-10 ASSESSMENT — ENCOUNTER SYMPTOMS
COUGH: 0
NERVOUS/ANXIOUS: 0
VOMITING: 0
ABDOMINAL PAIN: 0
SHORTNESS OF BREATH: 1
WEIGHT LOSS: 0
DIARRHEA: 0
HEADACHES: 0
WHEEZING: 0
BLURRED VISION: 0
FEVER: 0
HALLUCINATIONS: 0
MUSCULOSKELETAL NEGATIVE: 1
DIZZINESS: 0
TREMORS: 0
TINGLING: 0
WEAKNESS: 0
DOUBLE VISION: 0
ORTHOPNEA: 0
DIAPHORESIS: 0
CHILLS: 0
PALPITATIONS: 0
CONSTIPATION: 0
NAUSEA: 0
HEARTBURN: 0

## 2021-08-10 ASSESSMENT — LIFESTYLE VARIABLES
AUDITORY DISTURBANCES: NOT PRESENT
HEADACHE, FULLNESS IN HEAD: NOT PRESENT
AUDITORY DISTURBANCES: NOT PRESENT
HEADACHE, FULLNESS IN HEAD: NOT PRESENT
AGITATION: NORMAL ACTIVITY
HEADACHE, FULLNESS IN HEAD: NOT PRESENT
TREMOR: TREMOR NOT VISIBLE BUT CAN BE FELT, FINGERTIP TO FINGERTIP
AGITATION: NORMAL ACTIVITY
ANXIETY: NO ANXIETY (AT EASE)
VISUAL DISTURBANCES: NOT PRESENT
ANXIETY: NO ANXIETY (AT EASE)
TREMOR: TREMOR NOT VISIBLE BUT CAN BE FELT, FINGERTIP TO FINGERTIP
ORIENTATION AND CLOUDING OF SENSORIUM: ORIENTED AND CAN DO SERIAL ADDITIONS
AGITATION: NORMAL ACTIVITY
TOTAL SCORE: 1
VISUAL DISTURBANCES: NOT PRESENT
TOTAL SCORE: 2
PAROXYSMAL SWEATS: BARELY PERCEPTIBLE SWEATING. PALMS MOIST
NAUSEA AND VOMITING: NO NAUSEA AND NO VOMITING
ANXIETY: NO ANXIETY (AT EASE)
ORIENTATION AND CLOUDING OF SENSORIUM: ORIENTED AND CAN DO SERIAL ADDITIONS
AUDITORY DISTURBANCES: NOT PRESENT
NAUSEA AND VOMITING: NO NAUSEA AND NO VOMITING
VISUAL DISTURBANCES: NOT PRESENT
PAROXYSMAL SWEATS: BARELY PERCEPTIBLE SWEATING. PALMS MOIST
NAUSEA AND VOMITING: NO NAUSEA AND NO VOMITING
ORIENTATION AND CLOUDING OF SENSORIUM: ORIENTED AND CAN DO SERIAL ADDITIONS
TOTAL SCORE: 2
TREMOR: TREMOR NOT VISIBLE BUT CAN BE FELT, FINGERTIP TO FINGERTIP
PAROXYSMAL SWEATS: NO SWEAT VISIBLE

## 2021-08-10 ASSESSMENT — COGNITIVE AND FUNCTIONAL STATUS - GENERAL
TURNING FROM BACK TO SIDE WHILE IN FLAT BAD: A LITTLE
MOVING TO AND FROM BED TO CHAIR: A LITTLE
MOBILITY SCORE: 18
HELP NEEDED FOR BATHING: A LITTLE
STANDING UP FROM CHAIR USING ARMS: A LITTLE
SUGGESTED CMS G CODE MODIFIER DAILY ACTIVITY: CJ
MOVING FROM LYING ON BACK TO SITTING ON SIDE OF FLAT BED: A LITTLE
WALKING IN HOSPITAL ROOM: A LITTLE
DRESSING REGULAR LOWER BODY CLOTHING: A LITTLE
DAILY ACTIVITIY SCORE: 22
SUGGESTED CMS G CODE MODIFIER MOBILITY: CK
CLIMB 3 TO 5 STEPS WITH RAILING: A LITTLE

## 2021-08-10 ASSESSMENT — GAIT ASSESSMENTS
DISTANCE (FEET): 150
ASSISTIVE DEVICE: FRONT WHEEL WALKER
GAIT LEVEL OF ASSIST: SUPERVISED

## 2021-08-10 NOTE — CARE PLAN
The patient is Watcher - Medium risk of patient condition declining or worsening    Shift Goals  Clinical Goals: CIWA monitoring, Monitor for increase respiratory needs  Patient Goals: rest, go home    Progress made toward(s) clinical / shift goals:    Problem: Psychosocial  Goal: Patient's level of anxiety will decrease  Outcome: Progressing  Flowsheets (Taken 8/9/2021 2228)  Decrease Anxiety Level:   Collaborated with patient to identify and develop coping strategies   Implemented stimuli reduction, calming techniques   Pharmacologic management per MD order     Problem: Respiratory  Goal: Patient will achieve/maintain optimum respiratory ventilation and gas exchange  Outcome: Progressing  Flowsheets  Taken 8/9/2021 2000 by Jessie Tamez, DANIEL  Deep Breathe and Cough: Performs Correctly  Taken 8/9/2021 1543 by Louis Simmons, C.N.A.  O2 Delivery Device: Silicone Nasal Cannula  Note: Seizure and aspiration precautions in place. Pt educated on use. Requires reinforcement.     Problem: Hemodynamics - Pneumonia  Goal: Patient's hemodynamics, fluid balance and neurologic status will be stable or improve  Outcome: Progressing  Note: Oxygen demand increased. Will continue to maximize respiratory efforts, deep breaths and coughing and administration of PRNs if needed.       Patient is not progressing towards the following goals: n/a

## 2021-08-10 NOTE — PROGRESS NOTES
Daily Progress Note:     Date of Service: 8/10/2021  Primary Team: [facility]   Attending: Ashok Chauhan M.D.   Senior Resident: Dr. Forte  Intern: Dr. Vital  Contact:  [phone]    Chief Complaint:   Difficulty breathing    Subjective:  Patient remarks feeling fine today.  Denies any chest pain, palpitations, wheezing, abdominal pain, headache, nausea, vomiting, weakness.  Talked patient about considering SNF after discharge in order to strengthen his muscles and be able to do his ADLs.  Patient was adamant that he did not want to go to rehab; believes he is well enough to ambulate and at his original baseline.  Discussed options with him and his wife's concerns about not being able to take care of him; decision pending.    Consultants/Specialty:  None  Review of Systems:   Review of Systems   Constitutional: Negative for chills, diaphoresis, fever, malaise/fatigue and weight loss.   HENT: Negative.    Eyes: Negative for blurred vision and double vision.   Respiratory: Positive for shortness of breath. Negative for cough and wheezing.    Cardiovascular: Negative for chest pain, palpitations, orthopnea and leg swelling.   Gastrointestinal: Negative for abdominal pain, constipation, diarrhea, heartburn, nausea and vomiting.   Genitourinary: Negative.    Musculoskeletal: Negative.    Skin: Negative.    Neurological: Negative for dizziness, tingling, tremors, weakness and headaches.   Psychiatric/Behavioral: Negative for hallucinations. The patient is not nervous/anxious.        Objective Data:   Vitals:   Temp:  [35.5 °C (95.9 °F)-36.4 °C (97.6 °F)] 35.5 °C (95.9 °F)  Pulse:  [60-69] 63  Resp:  [10-18] 18  BP: (120-137)/(71-83) 120/71  SpO2:  [94 %-96 %] 95 %  Physical Exam:   Physical Exam  Constitutional:       General: He is not in acute distress.     Appearance: He is obese. He is not diaphoretic.      Interventions: Nasal cannula in place.   HENT:      Head: Normocephalic and atraumatic.       Nose: Nose normal.      Mouth/Throat:      Mouth: Mucous membranes are dry.      Pharynx: Oropharynx is clear.   Eyes:      Extraocular Movements: Extraocular movements intact.      Conjunctiva/sclera: Conjunctivae normal.      Pupils: Pupils are equal, round, and reactive to light.   Neck:      Vascular: No carotid bruit.   Cardiovascular:      Rate and Rhythm: Normal rate and regular rhythm.      Pulses: Normal pulses.      Heart sounds: Normal heart sounds. No murmur heard.   No gallop.    Pulmonary:      Effort: Pulmonary effort is normal. No respiratory distress.      Breath sounds: Wheezing (mild wheezes in upper lobes) present.   Chest:      Chest wall: No tenderness.   Abdominal:      General: Bowel sounds are normal. There is distension.      Tenderness: There is no abdominal tenderness. There is no guarding.   Musculoskeletal:         General: No tenderness.      Right lower leg: Edema present.      Left lower leg: Edema present.   Skin:     General: Skin is warm and dry.   Neurological:      General: No focal deficit present.      Mental Status: He is alert and oriented to person, place, and time. Mental status is at baseline.   Psychiatric:         Mood and Affect: Mood normal.         Labs:   See results tab    Imaging:   EC-ECHOCARDIOGRAM COMPLETE W/O CONT   Final Result      DX-CHEST-PORTABLE (1 VIEW)   Final Result      1.  There is no enlarged cardiac silhouette with probable changes of vascular congestion/edema.   2.  Right lower lobe opacity could be edema, atelectasis or pneumonitis.   3.  There is a small right pleural effusion.         71-year-old male with a past medical history of CAD (with pacemaker), hypertension, GERD, neuropathy is admitted due to shortness of breath, weakness, lethargy. Has acute hypoxic respiratory failure secondary to pneumonia and/or congestive heart failure (EF 70% with grade 3 diastolic dysfunction). Treating with oxygen, bronchodilators, incentive  spirometer. Discontinued all sedatives and CIWA protocol.  Monitor electrolytes and replete as necessary. PT/OT reevaluated him today and suggest home health; talked to the wife 2 times this afternoon and she is concerned that if he goes on home health he will return to his drinking, immobility, inability to do his ADLs; is afraid that he will not recuperate appropriately and she will not be able to take care of him.  Placement is pending; follow-up.  Probable discharge tomorrow if placement is set up.      * Acute hypoxemic respiratory failure (HCC)  Assessment & Plan  Suspect due to CHF and pneumonia  ELEAZAR? Patient remarked being diagnosed but not on CPAP; mallempai: 4; ABG: respi acidosis   *Did not tolerate CPAP last night or the night before: Refuses to use it.  Completed Augmentin  ECHO: Grade 3 diastolic dysfunction, EF 70%  Continue lasix; discontinue spironolactone (K: 4.6)  Continue oxygen (not reliant on O2 at home); try to wean off  Continue Duonebs, d/c prednisone 40 mg  Query underlying copd (mild expiratory wheezes) with extensive smoking h/o  Mobilize patient as tolerated; PT/OT consulted: they initially recommended SNF due to weakness; evaluation today they recommend home health. I talked to his wife via phone 2 times today and she is very concerned because he has had home health in the past and it has not worked (she goes back to drinking and not being able to do his ADLs); continues to feel that the best thing for him is SNF in order for him to further strengthen and be able to take care of himself.  We will follow up recommendations and check in with her tomorrow a.m. Ultimately, it is up to the patient.      Pulmonary hypertension (HCC)  Assessment & Plan  Type II (LVH, HPF) versus III (OHS, ELEAZAR)  On Lasix 40; hold for SBP less than 100  Controlling BP: Amiodarone 200 (still unsure as to why using this medication), clonidine 0.1 mg, hydralazine 25 mg, metoprolol 100 mg.  Monitor vitals    Acute  renal failure (ARF) (HCC)  Assessment & Plan  Decreased GFR; unknown baseline    FeNa: <1%; most likely prerenal  Condom kidd on  BUN: 39  CR: 1.6    Alcohol withdrawal (HCC)  Assessment & Plan  H/o etoh abuse; last use Wednesday    Discontinued CIWA protocol; increased Seroquel to 100 mg at night for agitation   *No tremors on physical exam today  Replete electrolytes as needed; f/u CMP      CAD (coronary artery disease)  Assessment & Plan  Suspected acute heart failure  EKG:no acute ischemic changes noted  Denies chest pain, palpitations, headache, dizziness  Trop only minimally elevated; will continue to trend  Discontinue telemetry today  On aspirin 81 mg, metoprolol, atorvastatin  ECHO: EF 70%  T4: 1.31    Alkaline phosphatase elevation  Assessment & Plan  Mild; Follow-up CMP  GGT: 76; slightly elevated; due to liver/biliary component (most likely his alcohol use)    Nicotine dependence  Assessment & Plan  Cessation discussed and recommended  replacement    Pneumonia  Assessment & Plan  Suspected right sided; resolved  B/c and sputum cultures: NEG  Completed Augmentin PO  On aspiration protocol  On seizure protocol  Incentive spirometer

## 2021-08-10 NOTE — PROGRESS NOTES
Monitor summary:      Rhythm: Paced  Rate: 59-69  Ectopy: o,r PVC  Measurements: n/a      12hr chart check    NO monitor strips available for upload

## 2021-08-10 NOTE — PROGRESS NOTES
"Called pt's wife Ngozi to update that PT/OT are recommending pt go home with home health instead of going to a skilled nursing facility as previously discussed with pt and his wife. Pt's wife voiced concerns about not having a place for pt to stay if he is discharged tomorrow, and that she does not want him to come home \"because he will immediately start drinking again.\" Hospitalist notified of wife's concerns.   "

## 2021-08-10 NOTE — THERAPY
"Physical Therapy   Daily Treatment     Patient Name: Ra Ware  Age:  71 y.o., Sex:  male  Medical Record #: [id number]  Today's Date: 8/10/2021     Precautions: Fall Risk    Assessment    Patient progressing with functional mobility. He performed bed mobility, transfers, and ambulation with FWW at supervision level. He reported no concerns regarding mobility or returning home following medical DC and reported he is mobilizing near functional baseline. Patient will not be actively followed for physical therapy services at this time, however may be seen if requested by physician for 1 more visit within 30 days to address any discharge or equipment needs.    Plan    Discharge secondary to goals met. Some goals Mod I, supervision provided during session    DC Equipment Recommendations: None (has FWW)  Discharge Recommendations: Recommend home health for continued physical therapy services      Subjective    \"They told me I have to go to rehab.\"     Objective       08/10/21 1455   Total Time Spent   Total Time Spent (Mins) 23   Charge Group   Charges  Yes   PT Therapeutic Activities 1   PT Self Care / Home Evaluation  1   Precautions   Precautions Fall Risk   Vitals   O2 (LPM) 2   O2 Delivery Device Silicone Nasal Cannula   Pain 0 - 10 Group   Therapist Pain Assessment   (no pain complaint)   Cognition    Cognition / Consciousness X   Speech/ Communication Delayed Responses   Level of Consciousness Alert   Comments slightly delayed responses but appears to be improving. cooperative with encouragement   Balance   Sitting Balance (Static) Fair +   Sitting Balance (Dynamic) Fair +   Standing Balance (Static) Fair   Standing Balance (Dynamic) Fair   Weight Shift Sitting Good   Weight Shift Standing Good   Skilled Intervention Verbal Cuing;Compensatory Strategies   Comments with FWW, no LOB   Gait Analysis   Gait Level Of Assist Supervised   Assistive Device Front Wheel Walker   Distance (Feet) 150   # of Times Distance was " Traveled 1   Deviation   (decreased fer)   # of Stairs Climbed 0   Weight Bearing Status no restrictions   Vision Deficits Impacting Mobility NT   Skilled Intervention Verbal Cuing   Comments patient reported limited ambulation distance at baseline; mostly in home or office   Bed Mobility    Supine to Sit Supervised  (pulled on sheets for trunk to upright)   Sit to Supine   (NT, left in shower with OT)   Scooting Supervised  (seated)   Skilled Intervention Verbal Cuing;Compensatory Strategies   Functional Mobility   Sit to Stand Supervised   Bed, Chair, Wheelchair Transfer Supervised   Transfer Method Stand Step   Skilled Intervention Verbal Cuing   How much difficulty does the patient currently have...   Turning over in bed (including adjusting bedclothes, sheets and blankets)? 3   Sitting down on and standing up from a chair with arms (e.g., wheelchair, bedside commode, etc.) 3   Moving from lying on back to sitting on the side of the bed? 3   How much help from another person does the patient currently need...   Moving to and from a bed to a chair (including a wheelchair)? 3   Need to walk in a hospital room? 3   Climbing 3-5 steps with a railing? 3   6 clicks Mobility Score 18   Activity Tolerance   Sitting in Chair NT   Sitting Edge of Bed 8 min   Standing 8 min   Comments limited by fatigue   Patient / Family Goals    Patient / Family Goal #1 go home   Short Term Goals    Short Term Goal # 1 The pt will demo supine<>sit EOB w/ HOB flat Gerard in 6 visits for independence w/ bed mobility.   Goal Outcome # 1 Progressing as expected   Short Term Goal # 2 The pt will demo gait distance >150' using FWW spv in a moving environment in 6 visits for household ambulation.   Goal Outcome # 2 Goal met   Short Term Goal # 3 The pt will demo ability to ascend/descend 1 step w/ UE support on railing spv in 6 visits for access to his home.   Goal Outcome # 3 Other (see comments)  (no stair available to assess, appears  capable)   Short Term Goal # 4 The pt will demo stand-step transfer EOB<>chair Sebastien w/ FWW in 6 visits for functional mobility   Goal Outcome # 4 Goal met  (supervision, appears capable of Sebastien)   Anticipated Discharge Equipment and Recommendations   DC Equipment Recommendations None  (has FWW)   Discharge Recommendations Recommend home health for continued physical therapy services   Interdisciplinary Plan of Care Collaboration   IDT Collaboration with  Nursing;Occupational Therapist   Patient Position at End of Therapy Seated;Other (Comments)  (with OT in shower)   Collaboration Comments RN aware of visit, response   Session Information   Date / Session Number  8/10-2 (2/4, 8/13)   Priority   (.)

## 2021-08-10 NOTE — HEART FAILURE PROGRAM
Acutely decompensated heart failure with preserved ejection fraction (HFpEF) Grade III DD with restrictive pattern.     It looks like patient lives in [place], [place] and has Medicare and AARP coverages.    Nursing: please complete the HF Discharge Checklist (pink sheet in hard chart) and have it cosigned by your charge RN before patient leaves the hospital.    Providers: below are all Guideline Directed Medical Therapy (GDMT) indicated for HFpEF. If any can not be prescribed by discharge, please note the clinical reason for each in your discharge summary.    QUICK SUMMARY OF HFpEF MEASURES    -- Anticoagulation for atrial arrhythmia, if applicable  -- Glycemic control for DM + HF, if diabetic  -- Lipid lowering medication for DM + HF, if diabetic  -- Pneumococcal vaccine if not previously received  -- Influenza vaccine if not previously received for the current flu season  -- Smoking cessation counseling documented if applicable    Daily Nurse: please begin to fill out the HF checklist (pink sheet in hard chart) and use it to guide your daily care.    Discharge Nurse: please ensure completeness of the HF checklist (pink sheet in hard chart) and have it co-signed by the charge RN before the patient leaves the hospital.    Thank you, Bárbara, Cardio RN Navigator [phone]      DETAILED EXPLANATION OF HF MEASURES:  1. Documentation of LV systolic function (echo or cath) PTA, during this hospitalization, or plan to assess post discharge or reason for not assessing documented  2. Documentation of fluid intake and urine output every nursing shift  3. 2 hour post diuretic assessment documented 2 hours after diuretic given  4. HF Patient Education using the Living Well With Heart Failure Booklet and Symptom Tracker documented every nursing shift  5. Nutrition consult for diet education  6. Daily weights (one weight documented every 24 hours) on a standing scale unless standing is contraindicated in which case bed scale can be used -  have patient write weight on symptom tracker  7. For LVEF less than or equal to 40%, ACE-I, ARNI or ARB prescribed at discharge   8. For LVEF less than or equal to 40%, an Evidence Based Beta Blocker (bisoprolol, carvedilol, toprol xl) must be prescribed at discharge  9. For LVEF less than or equal to 35% aldosterone blockade prescribed at discharge  10. The combination of hydralazine and isosorbide dinitrate is recommended to reduce morbidity and mortality for patients self-described  Americans with NYHA class III-IV HFrEF (EF 40% or less), receiving optimal therapy with ACE inhibitors and beta blockers, unless contraindicated (Class I, LEX: A).  11. If a HF patient is diabetic or is newly diagnosed with DM: prescribed diabetes treatment at discharge in the form of glycemic control (diet or anti-hyperglycemic medication) or f/u appointment for diabetes management scheduled at discharge.  12. If a HF patient has diabetes: prescribed lipid lowering medication at discharge  13. Documented smoking cessation advice or counseling  14. If a HF patient has a-fib: anticoagulation is prescribed upon discharge or contraindication is documented  15. Screening for and administering immunizations as long as no contraindications: Pneumonia (regardless of age) and Influenza  16. Written discharge instructions include:  ? Daily weights  ? Record weight on tracker  ? Bring tracker to appointments  ? Call MD for weight gain of 3lb /day or 5lb/week  ? HF medication teaching  ? Low sodium diet  ? Follow up appointment within seven calendar days of d/c must include: date, time and location  ? Activity  ? Worsening symptoms    What if any of the above HF measures are contraindicated?  ? Request that the discharging provider document the medication/intervention and the contraindication specifically in a progress note  ? For example: “no CHF meds due to hypotension” is not enough. It needs to say: “No ACE-I, ARNI, ARB due to  hypotension”; “No Beta Blockade due to bradycardia”…

## 2021-08-10 NOTE — PROGRESS NOTES
Assumed care of PT A&O 4. Pt resting in bed with no signs of labored breathing. On RA. Tele monitor in place, cardiac rhythm being monitored. Call light within reach, bed in lowest position, upper bed rails up. Pt was updated on plan of care for the day.

## 2021-08-10 NOTE — PROGRESS NOTES
Paged [facility] Resident, Sarita. Requesting continuation of CIWA protocol -on assessment, score is at 5 and no PRNs available. Orders received.

## 2021-08-10 NOTE — CARE PLAN
Problem: Knowledge Deficit - Standard  Goal: Patient and family/care givers will demonstrate understanding of plan of care, disease process/condition, diagnostic tests and medications  Outcome: Progressing     Problem: Optimal Care for Alcohol Withdrawal  Goal: Optimal Care for the alcohol withdrawal patient  Outcome: Progressing     Problem: Skin Integrity  Goal: Skin integrity is maintained or improved  Outcome: Progressing     Problem: Self Care  Goal: Patient will have the ability to perform ADLs independently or with assistance (bathe, groom, dress, toilet and feed)  Outcome: Progressing

## 2021-08-10 NOTE — PROGRESS NOTES
Received report from Day shift RN. Assumed care of patient at 1900. Patient A/Ox4 at this this time. CIWA assessment completed. On RA, pt breathing shallow and SpO2 at 83-84%. Education provided regarding use of supplemental oxygen. Pt agreeable - placed on 3L NC - SpO2 improving to 91-94%. Pt refusing CPAP at bedside. Education provided. No acute signs of pain or discomfort. Call light and belongings within reach. Bed in lowest locked position. Upper side rails raised. Hourly rounding in place. Will continue to monitor.     On assessment, pt requested ambulating out of bed and in the halls in the morning. Education provided. Will endorse to day shift RN.

## 2021-08-10 NOTE — THERAPY
Occupational Therapy  Daily Treatment     Patient Name: Ra Ware  Age:  71 y.o., Sex:  male  Medical Record #: [id number]  Today's Date: 8/10/2021       Precautions: Fall Risk    Assessment    Pt seen for OT tx. Continues to be limited by decreased activity tolerance impacting ability to complete ADLs and ADL transfers independently. Pt demonstrated improved OOB activity tolerance this session from previous session. Completed seated shower w/ setup and supv. Required mod A for sit > stand from shower bench d/t low surface height. Required supv from EOB. Pt reports that he uses AE to assist w/ LB dressing at home. Discussed home safety w/ pt, states that his SO will be home w/ him as needed. Will continue to benefit from OT services while in house.     Plan    Continue current treatment plan.    DC Equipment Recommendations: Unable to determine at this time  Discharge Recommendations: Recommend home health for continued occupational therapy services       08/10/21 9140   Cognition    Cognition / Consciousness X   Safety Awareness Impaired   New Learning Impaired   Attention Impaired   Balance   Sitting Balance (Static) Fair +   Sitting Balance (Dynamic) Fair +   Standing Balance (Static) Fair   Standing Balance (Dynamic) Fair   Weight Shift Sitting Good   Weight Shift Standing Good   Bed Mobility    Supine to Sit   (up in shower )   Sit to Supine Supervised   Scooting Supervised   Rolling Supervised   Activities of Daily Living   Grooming Supervision;Seated   Upper Body Dressing Supervision   Lower Body Dressing Maximal Assist  (uses AE at baseline)   Toileting Supervision   Functional Mobility   Sit to Stand Supervised   Bed, Chair, Wheelchair Transfer Supervised   Toilet Transfers Supervised   Tub / Shower Transfers Moderate Assist   Short Term Goals   Short Term Goal # 1 Pt will complete ADL transfers with supv    Goal Outcome # 1 Goal met   Short Term Goal # 2 Pt will complete LB dressing with supv using AE as  needed    Goal Outcome # 2 Progressing as expected   Short Term Goal # 3 Pt will complete standing grooming with supv   Goal Outcome # 3 Goal met   Anticipated Discharge Equipment and Recommendations   DC Equipment Recommendations Unable to determine at this time   Discharge Recommendations Recommend home health for continued occupational therapy services

## 2021-08-10 NOTE — CONSULTS
"    Physical Medicine and Rehabilitation Consultation              Date of initial consultation: 8/10/2021  Requesting provider: Charles Chauhan MD   Consulting provider: Mounika Henry D.O.  Reason for consultation: assess for acute inpatient rehab appropriateness  LOS: 5 Day(s)    Chief complaint: Altered mental status    HPI: The patient is a 71 y.o. right hand dominant male with a past medical history of hypertension, chronic back pain, significant alcohol abuse, nicotine dependence and GERD;  who presented on 8/5/2021 10:53 AM with confusion.  Per documentation patient and his wife are visiting [place] from [facility] for how August nights.  Patient reportedly had been started on Lyrica 3 days prior to arrival to [facility].  Since starting Lyrica patient's wife reported that the patient had been \"hallucinating all night.\"  In addition to recently starting Lyrica patient had been drinking heavily for the last several days.  Upon evaluation emergency department patient reported that he had been short of breath for 3 days, in the emergency department patient was found to be hypoxic to 83% on room air.  X-ray revealed patient to have a right-sided pleural effusion.  An echocardiogram was obtained which showed an EF of 70% with grade 3 diastolic dysfunction, patient's hypoxia secondary to pneumonia with a simultaneous CHF exacerbation.  Patient has been treated with Augmentin for his community-acquired pneumonia and is on Lasix for CHF exacerbation.  Patient's hospital course has been complicated by signs and symptoms of alcohol withdrawal, WA protocol in place.  On 8/9 patient was unable to participate with therapies due to alcohol withdrawal symptoms.  On 8/10 patient was able to participate with PT, functioning at a supervision level with a front wheeled walker 150 feet.    Patient seen and examined at bedside. The patient currently reports he is feels fine.  Patient reports he feels like he is nearly \"back " "to normal\".  Is asking about when he can go home.  Patient appears to have poor insight to his recent deficits.  Patient reports numbness and tingling at the bottoms of his feet which is chronic otherwise patient denies headache, blurry vision, chest pain, shortness of breath, abdominal pain or weakness.    Social Hx:  Patient lives in [place] with his wife in a home with 1 steps to enter, wife is available to assist with cares  1 PROSPER  At prior level of function mod I with a single-point cane for home and at work      Employment: Works as a car   Tobacco: 40-pack-year smoking history currently smoking a half a pack per day  Alcohol: Drinks approximately 2-3 vodka tonics per day  Drugs: Denies    THERAPY:  Restrictions: Fall risk  PT: Functional mobility   8/10 PT note: Min assist 150 feet with FW W, supervision for transfers    OT: ADLs   OT note: Min assist bed mobility, max assist lower body dressing    SLP:   No SLP notes    IMAGIN/6 echocardiogram: EF 70%, and evidence of grade 3 diastolic dysfunction  CONCLUSIONS  No prior study is available for comparison.   Left ventricular ejection fraction is visually estimated to be 70%.  Grade III diastolic dysfunction (restrictive pattern).  Mild to moderate mitral regurgitation.  Mild tricuspid regurgitation.  Estimated right ventricular systolic pressure is 55 mmHg.     chest x-ray  IMPRESSION:     1.  There is no enlarged cardiac silhouette with probable changes of vascular congestion/edema.  2.  Right lower lobe opacity could be edema, atelectasis or pneumonitis.  3.  There is a small right pleural effusion.    PROCEDURES:  None    PMH:  Past Medical History:   Diagnosis Date   • CAD (coronary artery disease)    • HTN (hypertension)        PSH:  Past Surgical History:   Procedure Laterality Date   • PACEMAKER INSERTION         FHX:  History reviewed. No pertinent family history.    Medications:  Current Facility-Administered Medications "   Medication Dose   • LORazepam (ATIVAN) tablet 0.5 mg  0.5 mg   • furosemide (LASIX) tablet 40 mg  40 mg   • QUEtiapine (Seroquel) tablet 100 mg  100 mg   • ipratropium-albuterol (DUONEB) nebulizer solution  3 mL   • atorvastatin (LIPITOR) tablet 40 mg  40 mg   • triamcinolone acetonide (KENALOG) 0.1 % cream     • psyllium (METAMUCIL/KONSYL) 1 Packet  1 Packet   • senna-docusate (PERICOLACE or SENOKOT S) 8.6-50 MG per tablet 2 tablet  2 tablet    And   • polyethylene glycol/lytes (MIRALAX) PACKET 1 Packet  1 Packet    And   • magnesium hydroxide (MILK OF MAGNESIA) suspension 30 mL  30 mL    And   • bisacodyl (DULCOLAX) suppository 10 mg  10 mg   • acetaminophen (Tylenol) tablet 650 mg  650 mg   • labetalol (NORMODYNE/TRANDATE) injection 10 mg  10 mg   • nicotine (NICODERM) 14 MG/24HR 14 mg  14 mg    And   • nicotine polacrilex (NICORETTE) 2 MG piece 2 mg  2 mg   • amiodarone (Cordarone) tablet 200 mg  200 mg   • cholecalciferol (VITAMIN D3) tablet 800 Units  800 Units   • cloNIDine (CATAPRES) tablet 0.1 mg  0.1 mg   • hydrALAZINE (APRESOLINE) tablet 25 mg  25 mg   • metoprolol SR (TOPROL XL) tablet 100 mg  100 mg   • omeprazole (PRILOSEC) capsule 20 mg  20 mg   • spironolactone (ALDACTONE) tablet 12.5 mg  12.5 mg   • heparin injection 5,000 Units  5,000 Units   • aspirin EC (ECOTRIN) tablet 81 mg  81 mg       Allergies:  Allergies   Allergen Reactions   • Morphine Rash and Itching           Physical Exam:    Vitals: /71   Pulse 63   Temp (!) 35.5 °C (95.9 °F) (Temporal)   Resp 18   Ht 1.829 m (6')   Wt 108 kg (237 lb 10.5 oz)   SpO2 95%   Gen: NAD, seated comfortably in bed, no family at side  Head:  NC/AT  Eyes/ Nose/ Mouth: PERRLA, moist mucous membranes  Cardio: RRR, good distal perfusion, warm extremities  Pulm: normal respiratory effort, no cyanosis, no witnessed wheezes or coughing  Abd: Soft NTND, negative borborygmi   Ext: No peripheral edema. No calf tenderness. No clubbing.  Darkening of lower  extremities distal to proximal, no edema    Mental status:  A&Ox4 (person, place, date, situation) answers questions appropriately follows commands, diminishes hospitalization, poor insight into deficits  Speech: fluent, no aphasia or dysarthria    CRANIAL NERVES:  2,3: visual acuity grossly intact, PERRL  3,4,6: EOMI bilaterally, no nystagmus or diplopia  5: sensation intact to light touch bilaterally and symmetric  7: no facial asymmetry  8: hearing grossly intact  9,10: symmetric palate elevation      Motor:      Upper Extremity  Myotome R L   Shoulder flexion C5 5/5 5/5   Elbow flexion C5 5/5 5/5   Wrist extension C6 5/5 5/5   Elbow extension C7 5/5 5/5   Finger flexion C8 5/5 5/5   Finger abduction T1 5/5 5/5     Lower Extremity Myotome R L   Hip flexion L2 5/5 5/5   Knee extension L3 5/5 5/5   Ankle dorsiflexion L4 5/5 5/5   Toe extension L5 5/5 5/5   Ankle plantarflexion S1 5/5 5/5       Negative Pronator drift bilaterally    Sensory:   intact to light touch through out, diminished plantar surface of bilateral feet  intact to proprioception at bilateral great toes    DTRs: 2+ in bilateral  biceps, 1+ in bilateral patellar tendons  No clonus at bilateral ankles  Negative babinski b/l  Negative Carlos b/l     Tone: no spasticity noted, no cogwheeling noted    Coordination:   intact finger to nose bilaterally  intact fine motor with fingers bilaterally  intact heel to shin bilaterally      Labs: Reviewed and significant for   Recent Labs     08/08/21  0823 08/09/21  0437 08/10/21  0400   RBC 4.48* 4.52* 4.43*   HEMOGLOBIN 14.4 14.2 14.0   HEMATOCRIT 46.1 45.9 45.1   PLATELETCT 215 223 232     Recent Labs     08/08/21  0823 08/08/21  1715 08/10/21  0400   SODIUM 143 141 144   POTASSIUM 4.6 4.9 4.6   CHLORIDE 103 104 105   CO2 32 28 29   GLUCOSE 113* 115* 125*   BUN 38* 37* 39*   CREATININE 1.71* 1.46* 1.61*   CALCIUM 9.7 9.8 10.0     Recent Results (from the past 24 hour(s))   CBC WITH DIFFERENTIAL    Collection  Time: 08/10/21  4:00 AM   Result Value Ref Range    WBC 8.6 4.8 - 10.8 K/uL    RBC 4.43 (L) 4.70 - 6.10 M/uL    Hemoglobin 14.0 14.0 - 18.0 g/dL    Hematocrit 45.1 42.0 - 52.0 %    .8 (H) 81.4 - 97.8 fL    MCH 31.6 27.0 - 33.0 pg    MCHC 31.0 (L) 33.7 - 35.3 g/dL    RDW 62.6 (H) 35.9 - 50.0 fL    Platelet Count 232 164 - 446 K/uL    MPV 11.3 9.0 - 12.9 fL    Neutrophils-Polys 76.10 (H) 44.00 - 72.00 %    Lymphocytes 12.00 (L) 22.00 - 41.00 %    Monocytes 9.70 0.00 - 13.40 %    Eosinophils 1.30 0.00 - 6.90 %    Basophils 0.30 0.00 - 1.80 %    Immature Granulocytes 0.60 0.00 - 0.90 %    Nucleated RBC 0.00 /100 WBC    Neutrophils (Absolute) 6.55 1.82 - 7.42 K/uL    Lymphs (Absolute) 1.03 1.00 - 4.80 K/uL    Monos (Absolute) 0.83 0.00 - 0.85 K/uL    Eos (Absolute) 0.11 0.00 - 0.51 K/uL    Baso (Absolute) 0.03 0.00 - 0.12 K/uL    Immature Granulocytes (abs) 0.05 0.00 - 0.11 K/uL    NRBC (Absolute) 0.00 K/uL   Comp Metabolic Panel    Collection Time: 08/10/21  4:00 AM   Result Value Ref Range    Sodium 144 135 - 145 mmol/L    Potassium 4.6 3.6 - 5.5 mmol/L    Chloride 105 96 - 112 mmol/L    Co2 29 20 - 33 mmol/L    Anion Gap 10.0 7.0 - 16.0    Glucose 125 (H) 65 - 99 mg/dL    Bun 39 (H) 8 - 22 mg/dL    Creatinine 1.61 (H) 0.50 - 1.40 mg/dL    Calcium 10.0 8.5 - 10.5 mg/dL    AST(SGOT) 15 12 - 45 U/L    ALT(SGPT) 15 2 - 50 U/L    Alkaline Phosphatase 118 (H) 30 - 99 U/L    Total Bilirubin 0.3 0.1 - 1.5 mg/dL    Albumin 3.0 (L) 3.2 - 4.9 g/dL    Total Protein 6.2 6.0 - 8.2 g/dL    Globulin 3.2 1.9 - 3.5 g/dL    A-G Ratio 0.9 g/dL   MAGNESIUM    Collection Time: 08/10/21  4:00 AM   Result Value Ref Range    Magnesium 2.1 1.5 - 2.5 mg/dL   PHOSPHORUS    Collection Time: 08/10/21  4:00 AM   Result Value Ref Range    Phosphorus 3.3 2.5 - 4.5 mg/dL   ESTIMATED GFR    Collection Time: 08/10/21  4:00 AM   Result Value Ref Range    GFR If  51 (A) >60 mL/min/1.73 m 2    GFR If Non  42 (A) >60  mL/min/1.73 m 2         ASSESSMENT:  Patient is a 71 y.o. male admitted with altered mental status/encephalopathy and hypoxia, found to have CAP and CHF    [facility] Code / Diagnosis to Support: 0002.9 - Brain Dysfunction: Other Brain    Rehabilitation: Impaired ADLs and mobility  Patient is a moderate candidate for inpatient rehab based on needs for PT, OT, and speech therapy, but lack of desire for IRF stay.  Patient will also benefit from family training for mobility and ADLs.  Patient has a good discharge situation which will be home with assistance from wife.    Barriers to transfer include: Insurance authorization, TCCs to verify disposition, medical clearance and bed availability     Additional Recommendations:  Encephalopathy  -Patient admitted with altered mental status and complaints of hallucinating since recent start of Lyrica  -Lyrica has been stopped, improved cognition  -Altered mental status secondary to exacerbation from alcohol withdrawal  -Continue with PT/OT/SLP, significant improvement in performance with therapies on 8/10 patient reports he was improved and is functioning back to his baseline  -Patient refusing postacute rehab, desires to do therapy with home health services    Hypoxia  -Secondary to CAP and CHF exacerbation  -Respiratory status improved with Lasix and Augmentin  -Respiratory status does not appear to be impairing patient's ability to ambulate    Disposition:  -Considering that patient needs to be able to tolerate the drive back home to [place], would recommend short IRF stay in order to improve strength endurance balance and stability in order to tolerate car transfers and seated tolerance  -However patient is refusing an IRF stay, requests to do therapy and home health setting; specifically requesting that home health be scheduled between when he is working  -Given patient's poor insight to his deficits and current function would recommend IRF stay versus home with, however patient  has capacity to choose his therapies at this point in time    Medical Complexity:  Pneumonia  Alcohol abuse  Alcohol withdrawal  Impaired mobility  Grade 3 diastolic heart failure  Pulmonary hypertension  CAD  Acute renal failure      DVT PPX: heparin       Thank you for allowing us to participate in the care of this patient.     Patient was seen for 80 minutes on unit/floor of which > 50% of time was spent on counseling and coordination of care regarding the above, including prognosis, risk reduction, benefits of treatment, and options for next stage of care.    Mounika Henry D.O.   Physical Medicine and Rehabilitation     Please note that this dictation was created using voice recognition software. I have made every reasonable attempt to correct obvious errors, but there may be errors of grammar and possibly content that I did not discover before finalizing the note.

## 2021-08-11 PROBLEM — J18.9 PNEUMONIA: Status: RESOLVED | Noted: 2021-08-05 | Resolved: 2021-08-11

## 2021-08-11 PROBLEM — F10.939 ALCOHOL WITHDRAWAL (HCC): Status: RESOLVED | Noted: 2021-08-05 | Resolved: 2021-08-11

## 2021-08-11 PROBLEM — I50.33 ACUTE ON CHRONIC HEART FAILURE WITH PRESERVED EJECTION FRACTION (HCC): Status: RESOLVED | Noted: 2021-08-09 | Resolved: 2021-08-11

## 2021-08-11 LAB
ALBUMIN SERPL BCP-MCNC: 3 G/DL (ref 3.2–4.9)
ALBUMIN SERPL ELPH-MCNC: 2.64 G/DL (ref 3.75–5.01)
ALBUMIN/GLOB SERPL: 1 G/DL
ALP SERPL-CCNC: 117 U/L (ref 30–99)
ALPHA1 GLOB SERPL ELPH-MCNC: 0.55 G/DL (ref 0.19–0.46)
ALPHA2 GLOB SERPL ELPH-MCNC: 1.06 G/DL (ref 0.48–1.05)
ALT SERPL-CCNC: 15 U/L (ref 2–50)
ANION GAP SERPL CALC-SCNC: 7 MMOL/L (ref 7–16)
AST SERPL-CCNC: 18 U/L (ref 12–45)
B-GLOBULIN SERPL ELPH-MCNC: 0.67 G/DL (ref 0.48–1.1)
BASOPHILS # BLD AUTO: 1.2 % (ref 0–1.8)
BASOPHILS # BLD: 0.1 K/UL (ref 0–0.12)
BILIRUB SERPL-MCNC: 0.4 MG/DL (ref 0.1–1.5)
BUN SERPL-MCNC: 36 MG/DL (ref 8–22)
CALCIUM SERPL-MCNC: 9.8 MG/DL (ref 8.5–10.5)
CHLORIDE SERPL-SCNC: 106 MMOL/L (ref 96–112)
CO2 SERPL-SCNC: 31 MMOL/L (ref 20–33)
CREAT SERPL-MCNC: 1.71 MG/DL (ref 0.5–1.4)
EOSINOPHIL # BLD AUTO: 0.43 K/UL (ref 0–0.51)
EOSINOPHIL NFR BLD: 5.3 % (ref 0–6.9)
ERYTHROCYTE [DISTWIDTH] IN BLOOD BY AUTOMATED COUNT: 60.9 FL (ref 35.9–50)
GAMMA GLOB SERPL ELPH-MCNC: 0.89 G/DL (ref 0.62–1.51)
GLOBULIN SER CALC-MCNC: 3.1 G/DL (ref 1.9–3.5)
GLUCOSE SERPL-MCNC: 103 MG/DL (ref 65–99)
HCT VFR BLD AUTO: 46.4 % (ref 42–52)
HGB BLD-MCNC: 14.5 G/DL (ref 14–18)
IGA SERPL-MCNC: 228 MG/DL (ref 68–408)
IGG SERPL-MCNC: 792 MG/DL (ref 768–1632)
IGM SERPL-MCNC: 277 MG/DL (ref 35–263)
IMM GRANULOCYTES # BLD AUTO: 0.05 K/UL (ref 0–0.11)
IMM GRANULOCYTES NFR BLD AUTO: 0.6 % (ref 0–0.9)
INTERPRETATION SERPL IFE-IMP: ABNORMAL
LYMPHOCYTES # BLD AUTO: 1.75 K/UL (ref 1–4.8)
LYMPHOCYTES NFR BLD: 21.4 % (ref 22–41)
MAGNESIUM SERPL-MCNC: 1.9 MG/DL (ref 1.5–2.5)
MCH RBC QN AUTO: 31.3 PG (ref 27–33)
MCHC RBC AUTO-ENTMCNC: 31.3 G/DL (ref 33.7–35.3)
MCV RBC AUTO: 100.2 FL (ref 81.4–97.8)
MONOCLON BAND OBS SERPL: ABNORMAL
MONOCYTES # BLD AUTO: 0.74 K/UL (ref 0–0.85)
MONOCYTES NFR BLD AUTO: 9.1 % (ref 0–13.4)
NEUTROPHILS # BLD AUTO: 5.1 K/UL (ref 1.82–7.42)
NEUTROPHILS NFR BLD: 62.4 % (ref 44–72)
NRBC # BLD AUTO: 0 K/UL
NRBC BLD-RTO: 0 /100 WBC
PATHOLOGY STUDY: ABNORMAL
PHOSPHATE SERPL-MCNC: 3 MG/DL (ref 2.5–4.5)
PLATELET # BLD AUTO: 222 K/UL (ref 164–446)
PMV BLD AUTO: 10.8 FL (ref 9–12.9)
POTASSIUM SERPL-SCNC: 4.9 MMOL/L (ref 3.6–5.5)
PROT SERPL-MCNC: 5.8 G/DL (ref 6.3–8.2)
PROT SERPL-MCNC: 6.1 G/DL (ref 6–8.2)
RBC # BLD AUTO: 4.63 M/UL (ref 4.7–6.1)
SODIUM SERPL-SCNC: 144 MMOL/L (ref 135–145)
WBC # BLD AUTO: 8.2 K/UL (ref 4.8–10.8)

## 2021-08-11 PROCEDURE — 99232 SBSQ HOSP IP/OBS MODERATE 35: CPT | Mod: GC | Performed by: INTERNAL MEDICINE

## 2021-08-11 PROCEDURE — 99232 SBSQ HOSP IP/OBS MODERATE 35: CPT | Performed by: PHYSICAL MEDICINE & REHABILITATION

## 2021-08-11 PROCEDURE — 700102 HCHG RX REV CODE 250 W/ 637 OVERRIDE(OP): Performed by: STUDENT IN AN ORGANIZED HEALTH CARE EDUCATION/TRAINING PROGRAM

## 2021-08-11 PROCEDURE — 83735 ASSAY OF MAGNESIUM: CPT

## 2021-08-11 PROCEDURE — 700111 HCHG RX REV CODE 636 W/ 250 OVERRIDE (IP): Performed by: HOSPITALIST

## 2021-08-11 PROCEDURE — A9270 NON-COVERED ITEM OR SERVICE: HCPCS | Performed by: STUDENT IN AN ORGANIZED HEALTH CARE EDUCATION/TRAINING PROGRAM

## 2021-08-11 PROCEDURE — 94660 CPAP INITIATION&MGMT: CPT

## 2021-08-11 PROCEDURE — 84100 ASSAY OF PHOSPHORUS: CPT

## 2021-08-11 PROCEDURE — 85025 COMPLETE CBC W/AUTO DIFF WBC: CPT

## 2021-08-11 PROCEDURE — 80053 COMPREHEN METABOLIC PANEL: CPT

## 2021-08-11 PROCEDURE — 770001 HCHG ROOM/CARE - MED/SURG/GYN PRIV*

## 2021-08-11 PROCEDURE — 700102 HCHG RX REV CODE 250 W/ 637 OVERRIDE(OP): Performed by: HOSPITALIST

## 2021-08-11 PROCEDURE — A9270 NON-COVERED ITEM OR SERVICE: HCPCS | Performed by: HOSPITALIST

## 2021-08-11 PROCEDURE — 36415 COLL VENOUS BLD VENIPUNCTURE: CPT

## 2021-08-11 RX ADMIN — ASPIRIN 81 MG: 81 TABLET, COATED ORAL at 05:13

## 2021-08-11 RX ADMIN — NICOTINE 14 MG: 14 PATCH TRANSDERMAL at 05:10

## 2021-08-11 RX ADMIN — CLONIDINE HYDROCHLORIDE 0.1 MG: 0.1 TABLET ORAL at 05:13

## 2021-08-11 RX ADMIN — DOCUSATE SODIUM 50 MG AND SENNOSIDES 8.6 MG 2 TABLET: 8.6; 5 TABLET, FILM COATED ORAL at 17:56

## 2021-08-11 RX ADMIN — HEPARIN SODIUM 5000 UNITS: 5000 INJECTION, SOLUTION INTRAVENOUS; SUBCUTANEOUS at 20:18

## 2021-08-11 RX ADMIN — OMEPRAZOLE 20 MG: 20 CAPSULE, DELAYED RELEASE ORAL at 17:56

## 2021-08-11 RX ADMIN — HEPARIN SODIUM 5000 UNITS: 5000 INJECTION, SOLUTION INTRAVENOUS; SUBCUTANEOUS at 15:24

## 2021-08-11 RX ADMIN — TRIAMCINOLONE ACETONIDE: 1 CREAM TOPICAL at 06:00

## 2021-08-11 RX ADMIN — Medication 200 MG: at 08:17

## 2021-08-11 RX ADMIN — HYDRALAZINE HYDROCHLORIDE 25 MG: 25 TABLET, FILM COATED ORAL at 17:57

## 2021-08-11 RX ADMIN — HEPARIN SODIUM 5000 UNITS: 5000 INJECTION, SOLUTION INTRAVENOUS; SUBCUTANEOUS at 05:13

## 2021-08-11 RX ADMIN — FUROSEMIDE 40 MG: 40 TABLET ORAL at 05:10

## 2021-08-11 RX ADMIN — PSYLLIUM HUSK 1 PACKET: 3.4 POWDER ORAL at 06:00

## 2021-08-11 RX ADMIN — QUETIAPINE FUMARATE 100 MG: 25 TABLET ORAL at 20:19

## 2021-08-11 RX ADMIN — AMIODARONE HYDROCHLORIDE 200 MG: 200 TABLET ORAL at 17:57

## 2021-08-11 RX ADMIN — CHOLECALCIFEROL (VITAMIN D3) 10 MCG (400 UNIT) TABLET 800 UNITS: at 17:56

## 2021-08-11 RX ADMIN — CLONIDINE HYDROCHLORIDE 0.1 MG: 0.1 TABLET ORAL at 17:57

## 2021-08-11 RX ADMIN — ATORVASTATIN CALCIUM 40 MG: 40 TABLET, FILM COATED ORAL at 17:56

## 2021-08-11 RX ADMIN — HYDRALAZINE HYDROCHLORIDE 25 MG: 25 TABLET, FILM COATED ORAL at 05:13

## 2021-08-11 RX ADMIN — SPIRONOLACTONE 12.5 MG: 25 TABLET ORAL at 05:10

## 2021-08-11 RX ADMIN — METOPROLOL SUCCINATE 100 MG: 50 TABLET, EXTENDED RELEASE ORAL at 17:56

## 2021-08-11 ASSESSMENT — ENCOUNTER SYMPTOMS
WEAKNESS: 0
COUGH: 0
DOUBLE VISION: 0
TINGLING: 0
HEADACHES: 0
BLURRED VISION: 0
TREMORS: 0
NAUSEA: 0
SHORTNESS OF BREATH: 0
VOMITING: 0
CHILLS: 0
PALPITATIONS: 0
DIAPHORESIS: 0
MYALGIAS: 0
CONSTIPATION: 0
WEIGHT LOSS: 0
ABDOMINAL PAIN: 0
DIZZINESS: 0
FEVER: 0
DIARRHEA: 0
DEPRESSION: 0
CLAUDICATION: 0
HEARTBURN: 0
WHEEZING: 0

## 2021-08-11 ASSESSMENT — PATIENT HEALTH QUESTIONNAIRE - PHQ9
1. LITTLE INTEREST OR PLEASURE IN DOING THINGS: NOT AT ALL
SUM OF ALL RESPONSES TO PHQ9 QUESTIONS 1 AND 2: 0
2. FEELING DOWN, DEPRESSED, IRRITABLE, OR HOPELESS: NOT AT ALL

## 2021-08-11 ASSESSMENT — PAIN DESCRIPTION - PAIN TYPE
TYPE: ACUTE PAIN
TYPE: ACUTE PAIN

## 2021-08-11 ASSESSMENT — FIBROSIS 4 INDEX: FIB4 SCORE: 1.53

## 2021-08-11 NOTE — PROGRESS NOTES
Received report from Day shift RN. Assumed care of patient at 1900. Patient A/Ox4 at this this time. On RA, no signs of respiratory distress. No acute complaints of pain or discomfort. Call light and belongings within reach. Bed in lowest locked position. Upper side rails raised. Hourly rounding in place. Will continue to monitor.

## 2021-08-11 NOTE — CARE PLAN
The patient is Stable - Low risk of patient condition declining or worsening    Shift Goals  Clinical Goals: Monitor hemodynamics, Titrate off oxygen  Patient Goals: rest, go home  Family Goals: n/a    Progress made toward(s) clinical / shift goals:    Problem: Psychosocial  Goal: Patient's level of anxiety will decrease  Outcome: Progressing  Flowsheets (Taken 8/9/2021 2228)  Decrease Anxiety Level:   Collaborated with patient to identify and develop coping strategies   Implemented stimuli reduction, calming techniques   Pharmacologic management per MD order  Note: No irritability or agitation noted on this shift. Pt aware of discharge home tomorrow and goals for POC during this shift.     Problem: Respiratory  Goal: Patient will achieve/maintain optimum respiratory ventilation and gas exchange  Outcome: Progressing  Flowsheets (Taken 8/10/2021 2000)  O2 Delivery Device: Silicone Nasal Cannula  Note: Pt titrated down to .5L O2 and SpO2 holding at 91-93% during this shift. Pt able to be on RA during ambulation to the bathroom.      Problem: Self Care  Goal: Patient will have the ability to perform ADLs independently or with assistance (bathe, groom, dress, toilet and feed)  Outcome: Progressing  Note: Independent ADLs encouraged.     Problem: Mobility  Goal: Patient's capacity to carry out activities will improve  Outcome: Progressing  Flowsheets (Taken 8/10/2021 2235)  Level of Mobility: Level IV  Activity Performed:   Up to bathroom   Back to bed  Note: Pt able to ambulate to the bathroom 1PA w/ FWW. General weakness noted from sitting to standing.       Patient is not progressing towards the following goals: n/a

## 2021-08-11 NOTE — PROGRESS NOTES
"    Physical Medicine and Rehabilitation Consultation  Follow up note            Date of initial consultation: 8/10/2021  Requesting provider: Charles Chauhan MD   Consulting provider: Mounika Henry D.O.  Reason for consultation: assess for acute inpatient rehab appropriateness  LOS: 6 Day(s)    Chief complaint: Altered mental status    HPI: The patient is a 71 y.o. right hand dominant male with a past medical history of hypertension, chronic back pain, significant alcohol abuse, nicotine dependence and GERD;  who presented on 8/5/2021 10:53 AM with confusion.  Per documentation patient and his wife are visiting [place] from [facility] for how August nights.  Patient reportedly had been started on Lyrica 3 days prior to arrival to [facility].  Since starting Lyrica patient's wife reported that the patient had been \"hallucinating all night.\"  In addition to recently starting Lyrica patient had been drinking heavily for the last several days.  Upon evaluation emergency department patient reported that he had been short of breath for 3 days, in the emergency department patient was found to be hypoxic to 83% on room air.  X-ray revealed patient to have a right-sided pleural effusion.  An echocardiogram was obtained which showed an EF of 70% with grade 3 diastolic dysfunction, patient's hypoxia secondary to pneumonia with a simultaneous CHF exacerbation.  Patient has been treated with Augmentin for his community-acquired pneumonia and is on Lasix for CHF exacerbation.  Patient's hospital course has been complicated by signs and symptoms of alcohol withdrawal, CIWA protocol in place.  On 8/9 patient was unable to participate with therapies due to alcohol withdrawal symptoms.  On 8/10 patient was able to participate with PT, functioning at a supervision level with a front wheeled walker 150 feet.    8/10: The patient currently reports he is feels fine.  Patient reports he feels like he is nearly \"back to normal\".  Is " asking about when he can go home.  Patient appears to have poor insight to his recent deficits.  Patient reports numbness and tingling at the bottoms of his feet which is chronic otherwise patient denies headache, blurry vision, chest pain, shortness of breath, abdominal pain or weakness.    : Matthew seen and examined at bedside. Patient reports that he would be willing to go to [facility] if he can have access to his clothes and his wallet. Reports he did not sleep well last night, reports his bed at the [facility] was much more comfortable, and he just wants to go back there to get a good nights sleep. Reports insomnia, and chronic numbness in feet.  Otherwise denies headache, lightheadedness, dizziness, chest pain, shortness of breath, or abdominal pain.    Social Hx:  Patient lives in [place] with his wife in a home with 1 steps to enter, wife is available to assist with cares  Wife is leaving [place] to go back home to [place] on , patient reports his daughter Mounika ( [phone]) will be traveling to [place] to assist him here/visit at the hospital.   1 PROSPER  At prior level of function mod I with a single-point cane for home and at work      Employment: Works as a car   Tobacco: 40-pack-year smoking history currently smoking a half a pack per day  Alcohol: Drinks approximately 2-3 vodka tonics per day  Drugs: Denies    THERAPY:  Restrictions: Fall risk  PT: Functional mobility   8/10 PT note: Min assist 150 feet with FW W, supervision for transfers    OT: ADLs   OT note: Min assist bed mobility, max assist lower body dressing  8/10 OT note: Supervision for toilet transfers, mod assist for transfer to tub/shower.    SLP:   No SLP notes    IMAGIN/6 echocardiogram: EF 70%, and evidence of grade 3 diastolic dysfunction  CONCLUSIONS  No prior study is available for comparison.   Left ventricular ejection fraction is visually estimated to be 70%.  Grade III diastolic dysfunction (restrictive  pattern).  Mild to moderate mitral regurgitation.  Mild tricuspid regurgitation.  Estimated right ventricular systolic pressure is 55 mmHg.    8/5 chest x-ray  IMPRESSION:     1.  There is no enlarged cardiac silhouette with probable changes of vascular congestion/edema.  2.  Right lower lobe opacity could be edema, atelectasis or pneumonitis.  3.  There is a small right pleural effusion.    PROCEDURES:  None    PMH:  Past Medical History:   Diagnosis Date   • CAD (coronary artery disease)    • HTN (hypertension)        PSH:  Past Surgical History:   Procedure Laterality Date   • PACEMAKER INSERTION         FHX:  History reviewed. No pertinent family history.    Medications:  Current Facility-Administered Medications   Medication Dose   • LORazepam (ATIVAN) tablet 0.5 mg  0.5 mg   • furosemide (LASIX) tablet 40 mg  40 mg   • QUEtiapine (Seroquel) tablet 100 mg  100 mg   • ipratropium-albuterol (DUONEB) nebulizer solution  3 mL   • atorvastatin (LIPITOR) tablet 40 mg  40 mg   • triamcinolone acetonide (KENALOG) 0.1 % cream     • psyllium (METAMUCIL/KONSYL) 1 Packet  1 Packet   • senna-docusate (PERICOLACE or SENOKOT S) 8.6-50 MG per tablet 2 tablet  2 Tablet    And   • polyethylene glycol/lytes (MIRALAX) PACKET 1 Packet  1 Packet    And   • magnesium hydroxide (MILK OF MAGNESIA) suspension 30 mL  30 mL    And   • bisacodyl (DULCOLAX) suppository 10 mg  10 mg   • acetaminophen (Tylenol) tablet 650 mg  650 mg   • labetalol (NORMODYNE/TRANDATE) injection 10 mg  10 mg   • nicotine (NICODERM) 14 MG/24HR 14 mg  14 mg    And   • nicotine polacrilex (NICORETTE) 2 MG piece 2 mg  2 mg   • amiodarone (Cordarone) tablet 200 mg  200 mg   • cholecalciferol (VITAMIN D3) tablet 800 Units  800 Units   • cloNIDine (CATAPRES) tablet 0.1 mg  0.1 mg   • hydrALAZINE (APRESOLINE) tablet 25 mg  25 mg   • metoprolol SR (TOPROL XL) tablet 100 mg  100 mg   • omeprazole (PRILOSEC) capsule 20 mg  20 mg   • spironolactone (ALDACTONE) tablet 12.5  mg  12.5 mg   • heparin injection 5,000 Units  5,000 Units   • aspirin EC (ECOTRIN) tablet 81 mg  81 mg       Allergies:  Allergies   Allergen Reactions   • Morphine Rash and Itching           Physical Exam:    Vitals: /68   Pulse 65   Temp 36.2 °C (97.2 °F) (Oral)   Resp 16   Ht 1.829 m (6')   Wt 108 kg (237 lb 10.5 oz)   SpO2 96%   Gen: NAD, seated comfortably in bed, no family at side  Head:  NC/AT  Eyes/ Nose/ Mouth: PERRLA, moist mucous membranes  Cardio: RRR, good distal perfusion, warm extremities  Pulm: normal respiratory effort, no cyanosis, no witnessed wheezes or coughing  Abd: Soft NTND, negative borborygmi   Ext: No peripheral edema. No calf tenderness. No clubbing.  Darkening of lower extremities distal to proximal, PVD, no edema    Mental status:  A&Ox4 (person, place, date, situation) answers questions appropriately follows commands, diminishes hospitalization, poor insight into deficits  Speech: fluent, no aphasia or dysarthria    CRANIAL NERVES:  2,3: visual acuity grossly intact, PERRL  3,4,6: EOMI bilaterally, no nystagmus or diplopia  5: sensation intact to light touch bilaterally and symmetric  7: no facial asymmetry  8: hearing grossly intact  9,10: symmetric palate elevation      Motor:      Upper Extremity  Myotome R L   Shoulder flexion C5 5/5 5/5   Elbow flexion C5 5/5 5/5   Wrist extension C6 5/5 5/5   Elbow extension C7 5/5 5/5   Finger flexion C8 5/5 5/5   Finger abduction T1 5/5 5/5     Lower Extremity Myotome R L   Hip flexion L2 5/5 5/5   Knee extension L3 5/5 5/5   Ankle dorsiflexion L4 5/5 5/5   Toe extension L5 5/5 5/5   Ankle plantarflexion S1 5/5 5/5       Negative Pronator drift bilaterally    Sensory:   intact to light touch through out, diminished plantar surface of bilateral feet  intact to proprioception at bilateral great toes    DTRs: 2+ in bilateral  biceps, 1+ in bilateral patellar tendons  No clonus at bilateral ankles  Negative babinski b/l  Negative  Carlos b/l     Tone: no spasticity noted, no cogwheeling noted    Coordination:   intact finger to nose bilaterally  intact fine motor with fingers bilaterally  intact heel to shin bilaterally      Labs: Reviewed and significant for   Recent Labs     08/09/21  0437 08/10/21  0400 08/11/21  0426   RBC 4.52* 4.43* 4.63*   HEMOGLOBIN 14.2 14.0 14.5   HEMATOCRIT 45.9 45.1 46.4   PLATELETCT 223 232 222     Recent Labs     08/08/21  1715 08/10/21  0400 08/11/21  0426   SODIUM 141 144 144   POTASSIUM 4.9 4.6 4.9   CHLORIDE 104 105 106   CO2 28 29 31   GLUCOSE 115* 125* 103*   BUN 37* 39* 36*   CREATININE 1.46* 1.61* 1.71*   CALCIUM 9.8 10.0 9.8     Recent Results (from the past 24 hour(s))   CBC WITH DIFFERENTIAL    Collection Time: 08/11/21  4:26 AM   Result Value Ref Range    WBC 8.2 4.8 - 10.8 K/uL    RBC 4.63 (L) 4.70 - 6.10 M/uL    Hemoglobin 14.5 14.0 - 18.0 g/dL    Hematocrit 46.4 42.0 - 52.0 %    .2 (H) 81.4 - 97.8 fL    MCH 31.3 27.0 - 33.0 pg    MCHC 31.3 (L) 33.7 - 35.3 g/dL    RDW 60.9 (H) 35.9 - 50.0 fL    Platelet Count 222 164 - 446 K/uL    MPV 10.8 9.0 - 12.9 fL    Neutrophils-Polys 62.40 44.00 - 72.00 %    Lymphocytes 21.40 (L) 22.00 - 41.00 %    Monocytes 9.10 0.00 - 13.40 %    Eosinophils 5.30 0.00 - 6.90 %    Basophils 1.20 0.00 - 1.80 %    Immature Granulocytes 0.60 0.00 - 0.90 %    Nucleated RBC 0.00 /100 WBC    Neutrophils (Absolute) 5.10 1.82 - 7.42 K/uL    Lymphs (Absolute) 1.75 1.00 - 4.80 K/uL    Monos (Absolute) 0.74 0.00 - 0.85 K/uL    Eos (Absolute) 0.43 0.00 - 0.51 K/uL    Baso (Absolute) 0.10 0.00 - 0.12 K/uL    Immature Granulocytes (abs) 0.05 0.00 - 0.11 K/uL    NRBC (Absolute) 0.00 K/uL   Comp Metabolic Panel    Collection Time: 08/11/21  4:26 AM   Result Value Ref Range    Sodium 144 135 - 145 mmol/L    Potassium 4.9 3.6 - 5.5 mmol/L    Chloride 106 96 - 112 mmol/L    Co2 31 20 - 33 mmol/L    Anion Gap 7.0 7.0 - 16.0    Glucose 103 (H) 65 - 99 mg/dL    Bun 36 (H) 8 - 22 mg/dL     Creatinine 1.71 (H) 0.50 - 1.40 mg/dL    Calcium 9.8 8.5 - 10.5 mg/dL    AST(SGOT) 18 12 - 45 U/L    ALT(SGPT) 15 2 - 50 U/L    Alkaline Phosphatase 117 (H) 30 - 99 U/L    Total Bilirubin 0.4 0.1 - 1.5 mg/dL    Albumin 3.0 (L) 3.2 - 4.9 g/dL    Total Protein 6.1 6.0 - 8.2 g/dL    Globulin 3.1 1.9 - 3.5 g/dL    A-G Ratio 1.0 g/dL   MAGNESIUM    Collection Time: 08/11/21  4:26 AM   Result Value Ref Range    Magnesium 1.9 1.5 - 2.5 mg/dL   PHOSPHORUS    Collection Time: 08/11/21  4:26 AM   Result Value Ref Range    Phosphorus 3.0 2.5 - 4.5 mg/dL   ESTIMATED GFR    Collection Time: 08/11/21  4:26 AM   Result Value Ref Range    GFR If  48 (A) >60 mL/min/1.73 m 2    GFR If Non  39 (A) >60 mL/min/1.73 m 2         ASSESSMENT:  Patient is a 71 y.o. male admitted with altered mental status/encephalopathy and hypoxia, found to have CAP and CHF    [facility] Code / Diagnosis to Support: 0002.9 - Brain Dysfunction: Other Brain    Rehabilitation: Impaired ADLs and mobility  Patient is a moderate candidate for inpatient rehab based on needs for PT, OT, and speech therapy, but lack of desire for IRF stay.  Patient will also benefit from family training for mobility and ADLs.  Patient has a good discharge situation which will be home with assistance from wife.    Barriers to transfer include: Insurance authorization, TCCs to verify disposition, medical clearance and bed availability     Additional Recommendations:  Encephalopathy  -Patient admitted with altered mental status and complaints of hallucinating since recent start of Lyrica  -Lyrica has been stopped, improved cognition  -Altered mental status secondary to exacerbation from alcohol withdrawal  -Continue with PT/OT/SLP, significant improvement in performance with therapies on 8/10 patient reports he was improved and is functioning back to his baseline  -Patient agreeable to IRF, however reports that he does not want to stay very long.  Patient  reports that he wants access to his clothes in his wallet when he gets to IRF.  Patient appears to be high risk for wanting to DC IRF shortly after arrival.    Hypoxia  -Secondary to CAP and CHF exacerbation  -Respiratory status improved with Lasix and Augmentin  -Respiratory status does not appear to be impairing patient's ability to ambulate    Peripheral neuropathy  -Patient with chronic numbness in distal portions of feet plantar surface, not on gabapentin  -Discoloration lower extremities and PVD pattern, neuropathy suspected secondary to significant alcohol use and PVD    Disposition:  -Considering that patient needs to be able to tolerate the drive back home to [place], would recommend short IRF stay in order to improve strength endurance balance and stability in order to tolerate car transfers and seated tolerance  -Recommend patient continue to do postacute rehab in IRF, for improved endurance balance, and stability  -Given patient's poor insight to his deficits and current function would recommend IRF stay versus home with, however patient has capacity to choose his therapies at this point in time  -Patient currently agreeable to IRF stay, however will need confirmation that family will be available for patient for discharge to IRF (wife is leaving [place] to return home to [place], daughter (Mounika) planning to arrive in [place]), TCC is to confirm dispo charge planning with family    Medical Complexity:  Pneumonia  Alcohol abuse  Alcohol withdrawal  Impaired mobility  Grade 3 diastolic heart failure  Pulmonary hypertension  CAD  Acute renal failure  Peripheral neuropathy      DVT PPX: heparin       Thank you for allowing us to participate in the care of this patient.     Patient was seen for 22 minutes on unit/floor of which > 50% of time was spent on counseling and coordination of care regarding the above, including prognosis, risk reduction, benefits of treatment, and options for next stage of  care.    Mounika Henry D.O.   Physical Medicine and Rehabilitation     Please note that this dictation was created using voice recognition software. I have made every reasonable attempt to correct obvious errors, but there may be errors of grammar and possibly content that I did not discover before finalizing the note.

## 2021-08-11 NOTE — PROGRESS NOTES
Received report from night shift RN. Assumed care of patient at 0800. Patient was A &Ox4 and denies pain at this time. Patient was on 1L of O2 via nasal cannula and was titrated down to 0.5 L. The patient was assisted with ambulation to the bathroom. Gait was staggering but steady with front wheeled walker. The patients call light was in reach and the bed was in the lowest position.

## 2021-08-12 VITALS
TEMPERATURE: 96.6 F | SYSTOLIC BLOOD PRESSURE: 132 MMHG | HEIGHT: 72 IN | HEART RATE: 61 BPM | DIASTOLIC BLOOD PRESSURE: 82 MMHG | OXYGEN SATURATION: 91 % | RESPIRATION RATE: 18 BRPM | WEIGHT: 224.21 LBS | BODY MASS INDEX: 30.37 KG/M2

## 2021-08-12 LAB
ALBUMIN SERPL BCP-MCNC: 2.9 G/DL (ref 3.2–4.9)
ALBUMIN/GLOB SERPL: 0.9 G/DL
ALP SERPL-CCNC: 115 U/L (ref 30–99)
ALT SERPL-CCNC: 17 U/L (ref 2–50)
ANION GAP SERPL CALC-SCNC: 9 MMOL/L (ref 7–16)
AST SERPL-CCNC: 23 U/L (ref 12–45)
BILIRUB SERPL-MCNC: 0.4 MG/DL (ref 0.1–1.5)
BUN SERPL-MCNC: 35 MG/DL (ref 8–22)
CALCIUM SERPL-MCNC: 9.7 MG/DL (ref 8.5–10.5)
CHLORIDE SERPL-SCNC: 101 MMOL/L (ref 96–112)
CO2 SERPL-SCNC: 30 MMOL/L (ref 20–33)
CREAT SERPL-MCNC: 1.82 MG/DL (ref 0.5–1.4)
GLOBULIN SER CALC-MCNC: 3.1 G/DL (ref 1.9–3.5)
GLUCOSE SERPL-MCNC: 109 MG/DL (ref 65–99)
POTASSIUM SERPL-SCNC: 4.5 MMOL/L (ref 3.6–5.5)
PROT SERPL-MCNC: 6 G/DL (ref 6–8.2)
SODIUM SERPL-SCNC: 140 MMOL/L (ref 135–145)

## 2021-08-12 PROCEDURE — 700102 HCHG RX REV CODE 250 W/ 637 OVERRIDE(OP): Performed by: STUDENT IN AN ORGANIZED HEALTH CARE EDUCATION/TRAINING PROGRAM

## 2021-08-12 PROCEDURE — 700102 HCHG RX REV CODE 250 W/ 637 OVERRIDE(OP): Performed by: HOSPITALIST

## 2021-08-12 PROCEDURE — A9270 NON-COVERED ITEM OR SERVICE: HCPCS | Performed by: STUDENT IN AN ORGANIZED HEALTH CARE EDUCATION/TRAINING PROGRAM

## 2021-08-12 PROCEDURE — 80053 COMPREHEN METABOLIC PANEL: CPT

## 2021-08-12 PROCEDURE — A9270 NON-COVERED ITEM OR SERVICE: HCPCS | Performed by: HOSPITALIST

## 2021-08-12 PROCEDURE — 36415 COLL VENOUS BLD VENIPUNCTURE: CPT

## 2021-08-12 PROCEDURE — 700111 HCHG RX REV CODE 636 W/ 250 OVERRIDE (IP): Performed by: HOSPITALIST

## 2021-08-12 PROCEDURE — 99239 HOSP IP/OBS DSCHRG MGMT >30: CPT | Mod: GC | Performed by: INTERNAL MEDICINE

## 2021-08-12 PROCEDURE — 97535 SELF CARE MNGMENT TRAINING: CPT | Mod: CO

## 2021-08-12 RX ORDER — QUETIAPINE FUMARATE 100 MG/1
100 TABLET, FILM COATED ORAL 2 TIMES DAILY
Qty: 60 TABLET | Refills: 3 | Status: SHIPPED | OUTPATIENT
Start: 2021-08-12

## 2021-08-12 RX ORDER — FUROSEMIDE 40 MG/1
40 TABLET ORAL DAILY
Qty: 30 TABLET | Refills: 0 | Status: CANCELLED | OUTPATIENT
Start: 2021-08-12

## 2021-08-12 RX ORDER — ALLOPURINOL 100 MG/1
50-100 TABLET ORAL 2 TIMES DAILY
Qty: 90 TABLET | Refills: 1 | Status: SHIPPED | OUTPATIENT
Start: 2021-08-12

## 2021-08-12 RX ORDER — SPIRONOLACTONE 25 MG/1
12.5 TABLET ORAL EVERY MORNING
Qty: 30 TABLET | Refills: 3 | Status: SHIPPED | OUTPATIENT
Start: 2021-08-13

## 2021-08-12 RX ORDER — HYDRALAZINE HYDROCHLORIDE 25 MG/1
25 TABLET, FILM COATED ORAL 2 TIMES DAILY
Qty: 90 TABLET | Refills: 0 | Status: SHIPPED | OUTPATIENT
Start: 2021-08-12

## 2021-08-12 RX ORDER — ASPIRIN 81 MG/1
81 TABLET ORAL DAILY
Qty: 30 TABLET | Refills: 0 | Status: SHIPPED | OUTPATIENT
Start: 2021-08-12

## 2021-08-12 RX ORDER — ATORVASTATIN CALCIUM 40 MG/1
40 TABLET, FILM COATED ORAL EVERY EVENING
Qty: 30 TABLET | Refills: 0 | Status: SHIPPED | OUTPATIENT
Start: 2021-08-12

## 2021-08-12 RX ORDER — METOPROLOL SUCCINATE 100 MG/1
100 TABLET, EXTENDED RELEASE ORAL EVERY EVENING
Qty: 30 TABLET | Refills: 0 | Status: SHIPPED | OUTPATIENT
Start: 2021-08-12

## 2021-08-12 RX ORDER — OMEPRAZOLE 20 MG/1
20 CAPSULE, DELAYED RELEASE ORAL EVERY EVENING
Qty: 30 CAPSULE | Refills: 0 | Status: SHIPPED | OUTPATIENT
Start: 2021-08-12

## 2021-08-12 RX ORDER — IPRATROPIUM BROMIDE AND ALBUTEROL SULFATE 2.5; .5 MG/3ML; MG/3ML
3 SOLUTION RESPIRATORY (INHALATION) EVERY 6 HOURS PRN
Qty: 3 ML | Refills: 1 | Status: SHIPPED | OUTPATIENT
Start: 2021-08-12

## 2021-08-12 RX ORDER — CLONIDINE HYDROCHLORIDE 0.1 MG/1
0.1 TABLET ORAL 2 TIMES DAILY
Qty: 60 TABLET | Refills: 0 | Status: CANCELLED | OUTPATIENT
Start: 2021-08-12

## 2021-08-12 RX ADMIN — PSYLLIUM HUSK 1 PACKET: 3.4 POWDER ORAL at 05:06

## 2021-08-12 RX ADMIN — ASPIRIN 81 MG: 81 TABLET, COATED ORAL at 05:06

## 2021-08-12 RX ADMIN — FUROSEMIDE 40 MG: 40 TABLET ORAL at 05:06

## 2021-08-12 RX ADMIN — SPIRONOLACTONE 12.5 MG: 25 TABLET ORAL at 05:06

## 2021-08-12 RX ADMIN — NICOTINE 14 MG: 14 PATCH TRANSDERMAL at 05:06

## 2021-08-12 RX ADMIN — CLONIDINE HYDROCHLORIDE 0.1 MG: 0.1 TABLET ORAL at 05:06

## 2021-08-12 RX ADMIN — DOCUSATE SODIUM 50 MG AND SENNOSIDES 8.6 MG 2 TABLET: 8.6; 5 TABLET, FILM COATED ORAL at 05:06

## 2021-08-12 RX ADMIN — HEPARIN SODIUM 5000 UNITS: 5000 INJECTION, SOLUTION INTRAVENOUS; SUBCUTANEOUS at 05:07

## 2021-08-12 RX ADMIN — HYDRALAZINE HYDROCHLORIDE 25 MG: 25 TABLET, FILM COATED ORAL at 05:06

## 2021-08-12 ASSESSMENT — COGNITIVE AND FUNCTIONAL STATUS - GENERAL
DRESSING REGULAR LOWER BODY CLOTHING: A LITTLE
DAILY ACTIVITIY SCORE: 23
SUGGESTED CMS G CODE MODIFIER DAILY ACTIVITY: CI

## 2021-08-12 ASSESSMENT — PAIN DESCRIPTION - PAIN TYPE: TYPE: ACUTE PAIN

## 2021-08-12 ASSESSMENT — FIBROSIS 4 INDEX: FIB4 SCORE: 1.83

## 2021-08-12 NOTE — CARE PLAN
Problem: Knowledge Deficit - Standard  Goal: Patient and family/care givers will demonstrate understanding of plan of care, disease process/condition, diagnostic tests and medications  Outcome: Progressing     Problem: Skin Integrity  Goal: Skin integrity is maintained or improved  Outcome: Progressing   The patient is Stable - Low risk of patient condition declining or worsening    Shift Goals  Clinical Goals: monitor O2 and need for O2   Patient Goals: go home   Family Goals: n/a

## 2021-08-12 NOTE — CARE PLAN
The patient is Stable - Low risk of patient condition declining or worsening    Shift Goals  Clinical Goals: monitor 02  Patient Goals: discharge to home  Family Goals: n/a    Progress made toward(s) clinical / shift goals:     Problem: Knowledge Deficit - Standard  Goal: Patient and family/care givers will demonstrate understanding of plan of care, disease process/condition, diagnostic tests and medications  Outcome: Progressing     Problem: Lifestyle Changes  Goal: Patient's ability to identify lifestyle changes and available resources to help reduce recurrence of condition will improve  Outcome: Progressing     Problem: Psychosocial  Goal: Patient's level of anxiety will decrease  Outcome: Progressing       Patient is not progressing towards the following goals:

## 2021-08-12 NOTE — PROGRESS NOTES
Pt discharged to home with qwife. Discharge instructions reviewed, and signed. IV and tele monitor removed. Medications retrieved from pharmacy sent home with patient. Pt escorted out with KORI Covarrubias.

## 2021-08-12 NOTE — PROGRESS NOTES
Daily Progress Note:     Date of Service: 8/11/2021  Primary Team: [facility] IM [facility] Team   Attending: Ashok Chauhan M.D.   Senior Resident: Dr. Forte  Intern: Dr. Vital  Contact:  [phone]    Chief Complaint:   Shortness of breath    Subjective:  Patient reports feeling great today.  No acute events overnight.  Denies chest pain, palpitations, nausea, vomiting, headache, weakness, abdominal pain.  Patient still refuses IRF; wife still insists that he gets some type of rehab stating she cannot and will not be able to take care of him if anything happens to him (due to her own medical problems).  Recent PT/OT evaluations remark he only needs home health; [facility] day stay.  Discharge home most likely be tomorrow morning pending discharge arrangements.  No acute event overnight    Consultants/Specialty:  None  Review of Systems:   Review of Systems   Constitutional: Negative for chills, diaphoresis, fever, malaise/fatigue and weight loss.   HENT: Negative.    Eyes: Negative for blurred vision and double vision.   Respiratory: Negative for cough, shortness of breath and wheezing.    Cardiovascular: Positive for leg swelling. Negative for chest pain, palpitations and claudication.   Gastrointestinal: Negative for abdominal pain, constipation, diarrhea, heartburn, nausea and vomiting.   Genitourinary: Negative.    Musculoskeletal: Negative for myalgias.   Skin: Negative.    Neurological: Negative for dizziness, tingling, tremors, weakness and headaches.   Psychiatric/Behavioral: Negative for depression.       Objective Data:   Vitals:   Temp:  [36.2 °C (97.2 °F)-37 °C (98.6 °F)] 37 °C (98.6 °F)  Pulse:  [60-72] 66  Resp:  [14-18] 16  BP: (118-143)/(68-88) 141/86  SpO2:  [89 %-96 %] 89 %  Physical Exam:   Physical Exam  Constitutional:       General: He is not in acute distress.     Appearance: Normal appearance. He is obese. He is not ill-appearing or diaphoretic.   HENT:      Head:  Normocephalic and atraumatic.      Nose: Nose normal.      Mouth/Throat:      Mouth: Mucous membranes are dry.      Pharynx: Oropharynx is clear.   Eyes:      Extraocular Movements: Extraocular movements intact.      Conjunctiva/sclera: Conjunctivae normal.      Pupils: Pupils are equal, round, and reactive to light.   Neck:      Vascular: No carotid bruit.   Cardiovascular:      Rate and Rhythm: Normal rate and regular rhythm.      Pulses: Normal pulses.      Heart sounds: Normal heart sounds. No murmur heard.   No gallop.    Pulmonary:      Effort: Pulmonary effort is normal. No respiratory distress.      Breath sounds: Wheezing (Mildly diffuse throughout) present.   Chest:      Chest wall: No tenderness.   Abdominal:      General: Bowel sounds are normal. There is distension.      Palpations: Abdomen is soft.      Tenderness: There is no abdominal tenderness. There is no guarding.   Musculoskeletal:         General: Swelling present. No tenderness. Normal range of motion.      Cervical back: Neck supple.   Skin:     General: Skin is warm and dry.   Neurological:      General: No focal deficit present.      Mental Status: He is alert and oriented to person, place, and time. Mental status is at baseline.   Psychiatric:         Mood and Affect: Mood normal.         Behavior: Behavior normal.         Labs:   See results tab    Imaging:   EC-ECHOCARDIOGRAM COMPLETE W/O CONT   Final Result      DX-CHEST-PORTABLE (1 VIEW)   Final Result      1.  There is no enlarged cardiac silhouette with probable changes of vascular congestion/edema.   2.  Right lower lobe opacity could be edema, atelectasis or pneumonitis.   3.  There is a small right pleural effusion.         71-year-old male with a past medical history of CAD (with pacemaker), hypertension, GERD, neuropathy is admitted due to shortness of breath, weakness, lethargy. PT/OT reevaluated him today and suggest home health; talked to the wife once this afternoon and she  is concerned that if he goes on home health he will return to his drinking, immobility, inability to do his ADLs; is afraid that he will not recuperate appropriately and she will not be able to take care of him.  Placement is pending; follow-up.  Probable discharge tomorrow if placement is set up.    * Acute hypoxemic respiratory failure (HCC)  Assessment & Plan  Suspect due to CHF and pneumonia  ELEAZAR? Patient remarked being diagnosed but not on CPAP; mallempai: 4; ABG: respi acidosis   *Did not tolerate CPAP last night or the night before: Refuses to use it.  Completed Augmentin  ECHO: Grade 3 diastolic dysfunction, EF 70%  Continue lasix; discontinue spironolactone (K: 4.6)  Continue oxygen (not reliant on O2 at home); try to wean off  Continue Duonebs, d/c prednisone 40 mg  Query underlying copd (mild expiratory wheezes) with extensive smoking h/o  Mobilize patient as tolerated; PT/OT consulted: They now recommend home health. I talked to his wife via phone 2 times today and she is very concerned because he has had home health in the past and it has not worked (he goes back to drinking and not being able to do his ADLs); continues to feel that the best thing for him is SNF/IRF in order for him to further strengthen and be able to take care of himself.  We will follow up recommendations.Ultimately, it is up to the patient.  Right now discharge is pending placement arrangements.      Pulmonary hypertension (HCC)  Assessment & Plan  Type II (LVH, HPF) versus III (OHS, ELEAZAR)  On Lasix 40; hold for SBP less than 100  Controlling BP: Amiodarone 200 (still unsure as to why using this medication), clonidine 0.1 mg, hydralazine 25 mg, metoprolol 100 mg.  Monitor vitals    Acute renal failure (ARF) (HCC)  Assessment & Plan  Decreased GFR; unknown baseline    FeNa: <1%; most likely prerenal  Condom kidd on  BUN: 36  CR: 1.7    CAD (coronary artery disease)  Assessment & Plan  Suspected acute heart failure  EKG:no acute  ischemic changes noted  Denies chest pain, palpitations, headache, dizziness  Discontinue telemetry today  On aspirin 81 mg, metoprolol, atorvastatin  ECHO: EF 70%  T4: 1.31    Alkaline phosphatase elevation  Assessment & Plan  Mild; Follow-up CMP  GGT: 76; slightly elevated; due to liver/biliary component (most likely his alcohol use)    Nicotine dependence  Assessment & Plan  Cessation discussed and recommended  replacement

## 2021-08-12 NOTE — CARE PLAN
The patient is Stable - Low risk of patient condition declining or worsening    Shift Goals  Clinical Goals: monitor O2 and need for O2   Patient Goals: go home   Family Goals: n/a    Problem: Knowledge Deficit - Standard  Goal: Patient and family/care givers will demonstrate understanding of plan of care, disease process/condition, diagnostic tests and medications  Outcome: Progressing  Note: Pt actively participates in POC. Pt and board updated, all questions and concerns answered. Pt encouraged to voice all questions and concerns.      Problem: Psychosocial  Goal: Patient's level of anxiety will decrease  Outcome: Progressing     Problem: Skin Integrity  Goal: Skin integrity is maintained or improved  Outcome: Progressing  Note: No new evidence of pressure injuries. Daily skin checks in place, hourly rounding in place to assess needs. WCTM.

## 2021-08-12 NOTE — DISCHARGE PLANNING
Diagnosis Debility.   Ongoing medical management as well as therapy need.  Anticipate post acute services to facilitate a successful transition to back to home community in [place], [place] .  Discharge support spouse.  Please consider a PMR referral to assist with plan of care.   
"Anticipated Discharge Disposition: Home    Action: Lsw spoke with pt to discuss medical clearance. Lsw explained that pt is able to dc home but will need to establish with a PCP for HH, pt is agreeable. Lsw asked if spouse would be able to  pt, pt stated that he does not know as \"the story keeps changing\". Lsw called and left vm for spouse stating pt is mc for home.     Barriers to Discharge: transportation for home    Plan: continue to follow     "
Anticipated Discharge Disposition: Home    Action: Patient has been declined by [facility] due to most recent PT/OT evals.  Patient may not qualify for SNF, and he is refusing SNF.  Patient will need to follow up with a PCP in [place] to arrange home health.  LINDSEY SINGH updated Dr. Bronson via Voalte.    Barriers to Discharge: Transportation arrangements home     Plan: Case coordination to continue to follow up with medical team to discuss discharge barriers.   
Anticipated Discharge Disposition: SNF vs IRF vs Home    Action: RN CM met with the patient at bedside to discuss discharge planning.  Patient explained that he would prefer to go to inpatient rehab.  Patient stated that he would not stay in a SNF.  RN CM explained that IRF would be plan A, and that SNF placement would be plan B.  RN CM provided the patient with a copy of the [facility] choice form and encouraged him to consider choices.      Barriers to Discharge: Placement, medical clearance     Plan: Case coordination to continue to follow up with medical team to discuss discharge barriers.   
Issued IMM to patient at  1215 on 08/12. Patient does not plan to appeal. Provided patient with copy.    
[facility] Acute Rehabilitation Transitional Care Coordination    Received update pt now agreeable to IRF admission.  Case reviewed with [facility] Medical Director Dr. James.  Current documentation does not demonstrate ongoing acute therapy need in 2 of 3 therapy disciplines meeting CMS criteria for IRF level care.  PT 8/10 reflects supervised for bed and functional mobility, ambulated 150ft with FWW at supervised level.  Per OT 8/10 - Anticipate home with home health for continued therapy services.      Anticipate home with home health/outpatient support.  Voalte message update to T7  Yousif.     
[facility] Transitional Care Coordination    Referral from:  Dr. Chauhan  Insurance Provider on Facesheet: Medicare/[employer]  Potential Rehab Diagnosis:  Debility    Chart review indicates patient has on going medical management and therapy needs to possibly meet inpatient rehab facility criteria with the goal of returning to community.    D/C support: Spouse     Physiatry to consult.  Would welcome OT eval to assist with determination for post acute care needs.      Last Covid test:  8/05/2021 not detected     Thank you for the referral.        
[facility] Transitional Care Coordination    Physiatry consult complete.  Dr. Henry reports pt declining inpatient rehab admission, prefers home with home health/therapy support.  Voalte update to SAMANTHA Dodd.     If there are interval changes, please reach out to [facility] [id number].      Thank you for the referral.    
Pulses equal bilaterally, no edema present.

## 2021-08-12 NOTE — PROGRESS NOTES
Report received at bedside from KEISHA RN Seda, pt care assumed. Pt aaox4, no signs of distress noted at this time. Patient resting comfortably in bed. Pt shows no signs of pain. Pt denies any additional needs at this time. Bed in lowest position, pt educated on fall risk and verbalized understanding, call light within reach, bed alarm plugged in and on.

## 2021-08-12 NOTE — DISCHARGE INSTRUCTIONS
Discharge Instructions    Discharged to home by car with relative. Discharged via wheelchair, hospital escort: Yes.  Special equipment needed: Not Applicable    Be sure to schedule a follow-up appointment with your primary care doctor or any specialists as instructed.     Discharge Plan:   Diet Plan: Discussed  Activity Level: Discussed  Confirmed Follow up Appointment: Patient to Call and Schedule Appointment  Confirmed Symptoms Management: Discussed  Medication Reconciliation Updated: Yes    I understand that a diet low in cholesterol, fat, and sodium is recommended for good health. Unless I have been given specific instructions below for another diet, I accept this instruction as my diet prescription.   Other diet: cardiac 2g sodium, 2000mL fluid restriction    Special Instructions: None    · Is patient discharged on Warfarin / Coumadin?   No     Depression / Suicide Risk    As you are discharged from this [facility] facility, it is important to learn how to keep safe from harming yourself.    Recognize the warning signs:  · Abrupt changes in personality, positive or negative- including increase in energy   · Giving away possessions  · Change in eating patterns- significant weight changes-  positive or negative  · Change in sleeping patterns- unable to sleep or sleeping all the time   · Unwillingness or inability to communicate  · Depression  · Unusual sadness, discouragement and loneliness  · Talk of wanting to die  · Neglect of personal appearance   · Rebelliousness- reckless behavior  · Withdrawal from people/activities they love  · Confusion- inability to concentrate     If you or a loved one observes any of these behaviors or has concerns about self-harm, here's what you can do:  · Talk about it- your feelings and reasons for harming yourself  · Remove any means that you might use to hurt yourself (examples: pills, rope, extension cords, firearm)  · Get professional help from the community (Mental Health,  Substance Abuse, psychological counseling)  · Do not be alone:Call your Safe Contact- someone whom you trust who will be there for you.  · Call your local CRISIS HOTLINE 866-6392 or 266-943-8508  · Call your local Children's Mobile Crisis Response Team [place] (344) 701-9751 or www.[employer]  · Call the toll free National Suicide Prevention Hotlines   · National Suicide Prevention Lifeline 152-623-EMXF (3896)  · [employer] Hope Line Network 800-SUICIDE (640-6872)      Follow-up with PCP for medical reconciliation, follow-up CMP (creatinine persistently elevated) and coordination for home health.   Suggest sleep study for evaluation for ELEAZAR; treat as required                          Acute Respiratory Failure, Adult    Acute respiratory failure occurs when there is not enough oxygen passing from your lungs to your body. When this happens, your lungs have trouble removing carbon dioxide from the blood. This causes your blood oxygen level to drop too low as carbon dioxide builds up.  Acute respiratory failure is a medical emergency. It can develop quickly, but it is temporary if treated promptly. Your lung capacity, or how much air your lungs can hold, may improve with time, exercise, and treatment.  What are the causes?  There are many possible causes of acute respiratory failure, including:  · Lung injury.  · Chest injury or damage to the ribs or tissues near the lungs.  · Lung conditions that affect the flow of air and blood into and out of the lungs, such as pneumonia, acute respiratory distress syndrome, and cystic fibrosis.  · Medical conditions, such as strokes or spinal cord injuries, that affect the muscles and nerves that control breathing.  · Blood infection (sepsis).  · Inflammation of the pancreas (pancreatitis).  · A blood clot in the lungs (pulmonary embolism).  · A large-volume blood transfusion.  · Burns.  · Near-drowning.  · Seizure.  · Smoke inhalation.  · Reaction to medicines.  · Alcohol  or drug overdose.  What increases the risk?  This condition is more likely to develop in people who have:  · A blocked airway.  · Asthma.  · A condition or disease that damages or weakens the muscles, nerves, bones, or tissues that are involved in breathing.  · A serious infection.  · A health problem that blocks the unconscious reflex that is involved in breathing, such as hypothyroidism or sleep apnea.  · A lung injury or trauma.  What are the signs or symptoms?  Trouble breathing is the main symptom of acute respiratory failure. Symptoms may also include:  · Rapid breathing.  · Restlessness or anxiety.  · Skin, lips, or fingernails that appear blue (cyanosis).  · Rapid heart rate.  · Abnormal heart rhythms (arrhythmias).  · Confusion or changes in behavior.  · Tiredness or loss of energy.  · Feeling sleepy or having a loss of consciousness.  How is this diagnosed?  Your health care provider can diagnose acute respiratory failure with a medical history and physical exam. During the exam, your health care provider will listen to your heart and check for crackling or wheezing sounds in your lungs. Your may also have tests to confirm the diagnosis and determine what is causing respiratory failure. These tests may include:  · Measuring the amount of oxygen in your blood (pulse oximetry). The measurement comes from a small device that is placed on your finger, earlobe, or toe.  · Other blood tests to measure blood gases and to look for signs of infection.  · Sampling your cerebral spinal fluid or tracheal fluid to check for infections.  · Chest X-ray to look for fluid in spaces that should be filled with air.  · Electrocardiogram (ECG) to look at the heart's electrical activity.  How is this treated?  Treatment for this condition usually takes places in a hospital intensive care unit (ICU). Treatment depends on what is causing the condition. It may include one or more treatments until your symptoms improve. Treatment  may include:  · Supplemental oxygen. Extra oxygen is given through a tube in the nose, a face mask, or a merino.  · A device such as a continuous positive airway pressure (CPAP) or bi-level positive airway pressure (BiPAP or BPAP) machine. This treatment uses mild air pressure to keep the airways open. A mask or other device will be placed over your nose or mouth. A tube that is connected to a motor will deliver oxygen through the mask.  · Ventilator. This treatment helps move air into and out of the lungs. This may be done with a bag and mask or a machine. For this treatment, a tube is placed in your windpipe (trachea) so air and oxygen can flow to the lungs.  · Extracorporeal membrane oxygenation (ECMO). This treatment temporarily takes over the function of the heart and lungs, supplying oxygen and removing carbon dioxide. ECMO gives the lungs a chance to recover. It may be used if a ventilator is not effective.  · Tracheostomy. This is a procedure that creates a hole in the neck to insert a breathing tube.  · Receiving fluids and medicines.  · Rocking the bed to help breathing.  Follow these instructions at home:  · Take over-the-counter and prescription medicines only as told by your health care provider.  · Return to normal activities as told by your health care provider. Ask your health care provider what activities are safe for you.  · Keep all follow-up visits as told by your health care provider. This is important.  How is this prevented?  Treating infections and medical conditions that may lead to acute respiratory failure can help prevent the condition from developing.  Contact a health care provider if:  · You have a fever.  · Your symptoms do not improve or they get worse.  Get help right away if:  · You are having trouble breathing.  · You lose consciousness.  · Your have cyanosis or turn blue.  · You develop a rapid heart rate.  · You are confused.  These symptoms may represent a serious problem that is  an emergency. Do not wait to see if the symptoms will go away. Get medical help right away. Call your local emergency services (911 in the [place] Do not drive yourself to the hospital.  This information is not intended to replace advice given to you by your health care provider. Make sure you discuss any questions you have with your health care provider.  Document Released: 12/23/2014 Document Revised: 11/30/2018 Document Reviewed: 07/05/2017  [employer] Patient Education © 2020 [employer]          --------------------    Heart Failure, Diagnosis    Heart failure is a condition in which the heart has trouble pumping blood because it has become weak or stiff. This means that the heart does not pump blood well enough for the body to stay healthy. For some people with heart failure, fluid may back up into the lungs. There may also be swelling (edema) in the lower legs. Heart failure is usually a long-term (chronic) condition. It is important for you to take good care of yourself and follow the treatment plan from your health care provider.  What are the causes?  This condition may be caused by:  · High blood pressure (hypertension). Hypertension causes the heart muscle to work harder than normal. This makes the heart stiff or weak.  · Coronary artery disease, or CAD. CAD is the buildup of cholesterol and fat (plaque) in the arteries of the heart.  · Heart attack, also called myocardial infarction. This injures the heart muscle, making it hard for the heart to pump blood.  · Abnormal heart valves. The valves do not open and close properly, forcing the heart to pump harder to keep the blood flowing.  · Heart muscle disease (cardiomyopathy or myocarditis). This is damage to the heart muscle. It can increase the risk of heart failure.  · Lung disease. The heart works harder when the lungs are not healthy.  · Abnormal heart rhythms. These can lead to heart failure.  What increases the risk?  The risk of heart failure  increases as a person ages. This condition is also more likely to develop in people who:  · Are overweight.  · Are male.  · Smoke or chew tobacco.  · Abuse alcohol or illegal drugs.  · Have taken medicines that can damage the heart, such as chemotherapy drugs.  · Have diabetes.  · Have abnormal heart rhythms.  · Have thyroid problems.  · Have low blood counts (anemia).  What are the signs or symptoms?  Symptoms of this condition include:  · Shortness of breath with activity, such as when climbing stairs.  · A cough that does not go away.  · Swelling of the feet, ankles, legs, or abdomen.  · Losing weight for no reason.  · Trouble breathing when lying flat (orthopnea).  · Waking from sleep because of the need to sit up and get more air.  · Rapid heartbeat.  · Tiredness (fatigue) and loss of energy.  · Feeling light-headed, dizzy, or close to fainting.  · Loss of appetite.  · Nausea.  · Waking up more often during the night to urinate (nocturia).  · Confusion.  How is this diagnosed?  This condition is diagnosed based on:  · Your medical history, symptoms, and a physical exam.  · Diagnostic tests, which may include:  ? Echocardiogram.  ? Electrocardiogram (ECG).  ? Chest X-ray.  ? Blood tests.  ? Exercise stress test.  ? Radionuclide scans.  ? Cardiac catheterization and angiogram.  How is this treated?  Treatment for this condition is aimed at managing the symptoms of heart failure.  Medicines  Treatment may include medicines that:  · Help lower blood pressure by relaxing (dilating) the blood vessels. These medicines are called ACE inhibitors (angiotensin-converting enzyme) and ARBs (angiotensin receptor blockers).  · Cause the kidneys to remove salt and water from the blood through urination (diuretics).  · Improve heart muscle strength and prevent the heart from beating too fast (beta blockers).  · Increase the force of the heartbeat (digoxin).  Healthy behavior changes         Treatment may also include making  healthy lifestyle changes, such as:  · Reaching and staying at a healthy weight.  · Quitting smoking or chewing tobacco.  · Eating heart-healthy foods.  · Limiting or avoiding alcohol.  · Stopping the use of illegal drugs.  · Being physically active.    Other treatments  Other treatments may include:  · Procedures to open blocked arteries or repair damaged valves.  · Placing a pacemaker to improve heart function (cardiac resynchronization therapy).  · Placing a device to treat serious abnormal heart rhythms (implantable cardioverter defibrillator, or ICD).  · Placing a device to improve the pumping ability of the heart (left ventricular assist device, or LVAD).  · Receiving a healthy heart from a donor (heart transplant). This is done when other treatments have not helped.  Follow these instructions at home:  · Manage other health conditions as told by your health care provider. These may include hypertension, diabetes, thyroid disease, or abnormal heart rhythms.  · Get ongoing education and support as needed. Learn as much as you can about heart failure.  · Keep all follow-up visits as told by your health care provider. This is important.  Summary  · Heart failure is a condition in which the heart has trouble pumping blood because it has become weak or stiff.  · This condition is caused by high blood pressure and other diseases of the heart and lungs.  · Symptoms of this condition include shortness of breath, tiredness (fatigue), nausea, and swelling of the feet, ankles, legs, or abdomen.  · Treatments for this condition may include medicines, lifestyle changes, and surgery.  · Manage other health conditions as told by your health care provider.  This information is not intended to replace advice given to you by your health care provider. Make sure you discuss any questions you have with your health care provider.  Document Released: 12/18/2006 Document Revised: 03/06/2020 Document Reviewed: 03/06/2020  [employer]  Patient Education © 2020 [employer]

## 2021-08-12 NOTE — PROGRESS NOTES
Bedside report received and patient care assumed. Pt is resting in bed, A&O4, with no complaints of pain, and is on RA. Pt is medical. All fall precautions are in place, belongings at bedside table.  Pt was updated on POC, no questions or concerns. Pt educated on use of call light for assistance. Will continue to monitor.

## 2021-08-15 NOTE — DISCHARGE SUMMARY
This version of the note has been redacted during the course of a chart correction case. If you need access to the original text of this version of the note, please contact the Health Information Management department at [phone].

## 2021-08-15 NOTE — DISCHARGE SUMMARY
Discharge Summary    Date of Admission: 8/5/2021  Date of Discharge: 8/12/2021  Discharging Attending: Ashok Chauhan MD   Discharging Senior Resident: Dr. Forte  Discharging Intern: Dr. Vital     CHIEF COMPLAINT ON ADMISSION  SOB     Reason for Admission  Acute Encephalopathy   Acute hypoxic respiratory failure   Acute Renal Failure    Admission Date  8/5/2021    CODE STATUS  Prior    HPI & HOSPITAL COURSE  This is a 71 y.o. male w/ pmhx of HTN, chronic back pain, severe alcohol and tobacco use disorder, GERD, CAD (s/p pacemaker) presented to ED on 8/5/2021 with c/o hallucinations, found to have acute hypoxic respiratory failure, acute renal failure, altered mental status and CAP. Patient was admitted for further evaluation and management of above.     #Hallucination  #Altered Mental Status   #Alcohol Withdrawal  - BIBA called by his wife due to new onset hallucination. Wife reports patient started on Lyrica 3 days prior to presentation, he has also been drinking more in the last few days. They are visitors from [place], in [place]. Last drink <24 hours from presentation  - Ammonia level WNL  - Lyrica stopped on admission. Placed on CIWA protocol, given high dose thiamine and MVI  - CIWA 10 on admission, remained high on the first two days of admission, although difficult to differentiate between agitation from alcohol withdrawal encephalopathy vs hypercapnic induced vs secondary to infection. CIWA subsequently low so was d/c on 8/10  - some night time agitation noted by nursing staff, so Seroquel started and dose increased to control sundowning. Patient was discharged on Seroquel 100mg nightly for symptoms of sundowning.   - mental status improved progressively after decreasing ativan use and pneumonia treated. On date of discharge, patient is back to baseline, AO x4 and able to articulate his wishes.     #Acute Respiratory Failure with mixed hypercapnia and hypoxia   #Pulmonary Edema   #CAP    #HFpEF  - CXR on admission shows vascular congestion, small right pleural effusion and small lower lobe opacity. Patient was initially treated on Azithromycin, switched over to Augmentin for suspicion of aspiration PNA, patient completed a 4 days course  - Echo done 8/6/2021 shows LVEF 70%,  Grade III diastolic dysfunction with restrictive pattern, RVSP 55mmHg  - Diuresis with IVP Lasix while inpatient, dose adjust according to volume status and kidney function. Patient close to euvolemia on day of discharge and was discharged with Spironolactone 25mg daily (home med)  - ABG done on 8/6/2021 with pH 7.33 and pCO2 57.5, susspect chronic hypercapnic respiratory failure with compensation. Considering his body habitus, highly suspicious for ELEAZAR. CPAP trial initiated although patient refused to wear it. P/E also with significant wheezing, although no PFT on file, considering his tobacco use history, highly suspicious of COPD exacerbation, so patient was treated for COPD exacerbation with scheduled nebulizers and steroids. Steroids discontinued after 4 days due to concerns for worsening his delirium/sundowning    #Acute Renal Failure   - Cr 2.46, BUN 41 on admission, without baseline so unknown if CKD vs CRYS although suspected pre-renal CRYS due to pulmonary congestions  - Cr function progressively improved with diuresis, Cr on discharge 1.86, will need repeat labs in one week to assess recovery.     Therefore, he is discharged in fair and stable condition to home with organized home healthcare and close outpatient follow-up.    The patient met 2-midnight criteria for an inpatient stay at the time of discharge.    PHYSICAL EXAM ON DISCHARGE  Temp:  [36.2 °C (97.2 °F)-37 °C (98.6 °F)] 37 °C (98.6 °F)  Pulse:  [60-72] 66  Resp:  [14-18] 16  BP: (118-143)/(68-88) 141/86  SpO2:  [89 %-96 %] 89 %    Physical Exam  Constitutional:       General: He is not in acute distress.     Appearance: Normal appearance. He is obese. He is  not ill-appearing or diaphoretic.   HENT:      Head: Normocephalic and atraumatic.      Nose: Nose normal.      Mouth/Throat:      Mouth: Mucous membranes are moist     Pharynx: Oropharynx is clear.   Eyes:      Extraocular Movements: Extraocular movements intact.      Conjunctiva/sclera: Conjunctivae normal.      Pupils: Pupils are equal, round, and reactive to light.   Neck:      Vascular: No carotid bruit.   Cardiovascular:      Rate and Rhythm: Normal rate and regular rhythm.      Pulses: Normal pulses.      Heart sounds: Normal heart sounds. No murmur heard.   No gallop.    Pulmonary:      Effort: Pulmonary effort is normal. No respiratory distress.      Breath sounds: Good air movement, no wheezing, bronchi or rales appreciated   Chest:      Chest wall: No tenderness.   Abdominal:      General: Bowel sounds are normal.      Palpations: Abdomen is soft.      Tenderness: There is no abdominal tenderness. There is no guarding.   Musculoskeletal:         General: Swelling present. No tenderness. Normal range of motion.      Cervical back: Neck supple.   Skin:     General: Skin is warm and dry.   Neurological:      General: No focal deficit present.      Mental Status: He is alert and oriented to person, place, and time. Mental status is at baseline.   Psychiatric:         Mood and Affect: Mood normal.         Behavior: Behavior normal.      Discharge Date  8/12/2021    FOLLOW UP ITEMS POST DISCHARGE  - Recheck BMP in 7 days to monitor kidney function   - Outpatient sleep study for ELEAZAR  - Follow up with Pulmonary for PFT for probable COPD     DISCHARGE DIAGNOSES  Acute on Chronic Respiratory Failure with mixed hypoxia and hypercapnia   HFpEF   CAD s/p pacemaker   HTN   Acute on Chronic Kidney Failure     FOLLOW UP  No future appointments.  No follow-up provider specified.    MEDICATIONS ON DISCHARGE  Allopurinol 100mg  Amiodorone 200mg qd  ASA 81mg qd  Atorvastain 40mg qd  Hydralazine 25mg TID   Duoneb  nebs  Metoprolol Succ 100mg qd  Omeprazole 20mg qd  Quetiapine 100mg qd  Spironactone 12.5mg qd       Allergies  Allergies   Allergen Reactions   • Morphine Rash and Itching             DIET  No orders of the defined types were placed in this encounter.      ACTIVITY  As tolerated.  Weight bearing as tolerated    CONSULTATIONS  None    PROCEDURES    Echo 8/6/2021-     CONCLUSIONS  No prior study is available for comparison.   Left ventricular ejection fraction is visually estimated to be 70%.  Grade III diastolic dysfunction (restrictive pattern).  Mild to moderate mitral regurgitation.  Mild tricuspid regurgitation.  Estimated right ventricular systolic pressure is 55 mmHg.